# Patient Record
Sex: FEMALE | Race: WHITE | NOT HISPANIC OR LATINO | URBAN - METROPOLITAN AREA
[De-identification: names, ages, dates, MRNs, and addresses within clinical notes are randomized per-mention and may not be internally consistent; named-entity substitution may affect disease eponyms.]

---

## 2018-06-05 ENCOUNTER — EMERGENCY (EMERGENCY)
Facility: HOSPITAL | Age: 43
LOS: 1 days | Discharge: HOME | End: 2018-06-05
Attending: EMERGENCY MEDICINE
Payer: MEDICAID

## 2018-06-05 VITALS
SYSTOLIC BLOOD PRESSURE: 128 MMHG | HEART RATE: 102 BPM | OXYGEN SATURATION: 98 % | DIASTOLIC BLOOD PRESSURE: 86 MMHG | TEMPERATURE: 98 F | RESPIRATION RATE: 18 BRPM

## 2018-06-05 VITALS
OXYGEN SATURATION: 98 % | TEMPERATURE: 99 F | RESPIRATION RATE: 18 BRPM | SYSTOLIC BLOOD PRESSURE: 125 MMHG | DIASTOLIC BLOOD PRESSURE: 78 MMHG | HEART RATE: 99 BPM

## 2018-06-05 LAB
ALBUMIN SERPL ELPH-MCNC: 3.7 G/DL — SIGNIFICANT CHANGE UP (ref 3.5–5)
ALP SERPL-CCNC: 88 U/L — SIGNIFICANT CHANGE UP (ref 40–120)
ALT FLD-CCNC: 34 U/L DA — SIGNIFICANT CHANGE UP (ref 10–60)
ANION GAP SERPL CALC-SCNC: 7 MMOL/L — SIGNIFICANT CHANGE UP (ref 5–17)
APPEARANCE UR: CLEAR — SIGNIFICANT CHANGE UP
AST SERPL-CCNC: 22 U/L — SIGNIFICANT CHANGE UP (ref 10–40)
BASOPHILS # BLD AUTO: 0.1 K/UL — SIGNIFICANT CHANGE UP (ref 0–0.2)
BASOPHILS NFR BLD AUTO: 0.8 % — SIGNIFICANT CHANGE UP (ref 0–2)
BILIRUB SERPL-MCNC: 0.3 MG/DL — SIGNIFICANT CHANGE UP (ref 0.2–1.2)
BILIRUB UR-MCNC: NEGATIVE — SIGNIFICANT CHANGE UP
BUN SERPL-MCNC: 11 MG/DL — SIGNIFICANT CHANGE UP (ref 7–18)
CALCIUM SERPL-MCNC: 8.3 MG/DL — LOW (ref 8.4–10.5)
CHLORIDE SERPL-SCNC: 107 MMOL/L — SIGNIFICANT CHANGE UP (ref 96–108)
CO2 SERPL-SCNC: 26 MMOL/L — SIGNIFICANT CHANGE UP (ref 22–31)
COLOR SPEC: YELLOW — SIGNIFICANT CHANGE UP
CREAT SERPL-MCNC: 0.6 MG/DL — SIGNIFICANT CHANGE UP (ref 0.5–1.3)
DIFF PNL FLD: ABNORMAL
EOSINOPHIL # BLD AUTO: 0.1 K/UL — SIGNIFICANT CHANGE UP (ref 0–0.5)
EOSINOPHIL NFR BLD AUTO: 1.1 % — SIGNIFICANT CHANGE UP (ref 0–6)
GLUCOSE SERPL-MCNC: 115 MG/DL — HIGH (ref 70–99)
GLUCOSE UR QL: NEGATIVE — SIGNIFICANT CHANGE UP
HCG SERPL-ACNC: <1 MIU/ML — SIGNIFICANT CHANGE UP
HCG UR QL: NEGATIVE — SIGNIFICANT CHANGE UP
HCT VFR BLD CALC: 34.3 % — LOW (ref 34.5–45)
HGB BLD-MCNC: 10.5 G/DL — LOW (ref 11.5–15.5)
KETONES UR-MCNC: ABNORMAL
LEUKOCYTE ESTERASE UR-ACNC: ABNORMAL
LIDOCAIN IGE QN: 93 U/L — SIGNIFICANT CHANGE UP (ref 73–393)
LYMPHOCYTES # BLD AUTO: 2.5 K/UL — SIGNIFICANT CHANGE UP (ref 1–3.3)
LYMPHOCYTES # BLD AUTO: 28.9 % — SIGNIFICANT CHANGE UP (ref 13–44)
MCHC RBC-ENTMCNC: 25.3 PG — LOW (ref 27–34)
MCHC RBC-ENTMCNC: 30.7 GM/DL — LOW (ref 32–36)
MCV RBC AUTO: 82.4 FL — SIGNIFICANT CHANGE UP (ref 80–100)
MONOCYTES # BLD AUTO: 0.5 K/UL — SIGNIFICANT CHANGE UP (ref 0–0.9)
MONOCYTES NFR BLD AUTO: 6.3 % — SIGNIFICANT CHANGE UP (ref 2–14)
NEUTROPHILS # BLD AUTO: 5.4 K/UL — SIGNIFICANT CHANGE UP (ref 1.8–7.4)
NEUTROPHILS NFR BLD AUTO: 62.9 % — SIGNIFICANT CHANGE UP (ref 43–77)
NITRITE UR-MCNC: NEGATIVE — SIGNIFICANT CHANGE UP
PH UR: 5 — SIGNIFICANT CHANGE UP (ref 5–8)
PLATELET # BLD AUTO: 212 K/UL — SIGNIFICANT CHANGE UP (ref 150–400)
POTASSIUM SERPL-MCNC: 3.9 MMOL/L — SIGNIFICANT CHANGE UP (ref 3.5–5.3)
POTASSIUM SERPL-SCNC: 3.9 MMOL/L — SIGNIFICANT CHANGE UP (ref 3.5–5.3)
PROT SERPL-MCNC: 7.6 G/DL — SIGNIFICANT CHANGE UP (ref 6–8.3)
PROT UR-MCNC: 30 MG/DL
RBC # BLD: 4.16 M/UL — SIGNIFICANT CHANGE UP (ref 3.8–5.2)
RBC # FLD: 14.4 % — SIGNIFICANT CHANGE UP (ref 10.3–14.5)
SODIUM SERPL-SCNC: 140 MMOL/L — SIGNIFICANT CHANGE UP (ref 135–145)
SP GR SPEC: 1.02 — SIGNIFICANT CHANGE UP (ref 1.01–1.02)
UROBILINOGEN FLD QL: 1
WBC # BLD: 8.6 K/UL — SIGNIFICANT CHANGE UP (ref 3.8–10.5)
WBC # FLD AUTO: 8.6 K/UL — SIGNIFICANT CHANGE UP (ref 3.8–10.5)

## 2018-06-05 PROCEDURE — 74176 CT ABD & PELVIS W/O CONTRAST: CPT

## 2018-06-05 PROCEDURE — 84702 CHORIONIC GONADOTROPIN TEST: CPT

## 2018-06-05 PROCEDURE — 83690 ASSAY OF LIPASE: CPT

## 2018-06-05 PROCEDURE — 74176 CT ABD & PELVIS W/O CONTRAST: CPT | Mod: 26

## 2018-06-05 PROCEDURE — 99284 EMERGENCY DEPT VISIT MOD MDM: CPT

## 2018-06-05 PROCEDURE — 81001 URINALYSIS AUTO W/SCOPE: CPT

## 2018-06-05 PROCEDURE — 81025 URINE PREGNANCY TEST: CPT

## 2018-06-05 PROCEDURE — 85027 COMPLETE CBC AUTOMATED: CPT

## 2018-06-05 PROCEDURE — 99284 EMERGENCY DEPT VISIT MOD MDM: CPT | Mod: 25

## 2018-06-05 PROCEDURE — 80053 COMPREHEN METABOLIC PANEL: CPT

## 2018-06-05 RX ORDER — SODIUM CHLORIDE 9 MG/ML
1000 INJECTION INTRAMUSCULAR; INTRAVENOUS; SUBCUTANEOUS ONCE
Qty: 0 | Refills: 0 | Status: COMPLETED | OUTPATIENT
Start: 2018-06-05 | End: 2018-06-05

## 2018-06-05 RX ADMIN — SODIUM CHLORIDE 1000 MILLILITER(S): 9 INJECTION INTRAMUSCULAR; INTRAVENOUS; SUBCUTANEOUS at 19:46

## 2018-06-05 NOTE — ED PROVIDER NOTE - PROGRESS NOTE DETAILS
CT negative for stone or pyleo.  ?passed stone.  Pt denies urinary complaints.  Pt given copy of all results. Will d/c

## 2018-06-05 NOTE — ED PROVIDER NOTE - CPE EDP SKIN NORM
Anesthesia Type: 1% lidocaine with epinephrine and a 1:10 solution of 8.4% sodium bicarbonate normal...

## 2018-06-05 NOTE — ED PROVIDER NOTE - OBJECTIVE STATEMENT
41 y/o F pt with PMHx of DM, Meningocele (congenital), and Lymphedema and no PSHx presents to ED c/o L flank pain x2 days. Pt describes L flank pain as 5/10 in intensity. Pt additionally reports mild nausea. Pt denies vomiting, hematuria, burning with urination, dysuria, difficulty urinating, or any other complaints. NKDA.

## 2018-06-05 NOTE — ED PROVIDER NOTE - MEDICAL DECISION MAKING DETAILS
41 y/o F pt presents with L flank pain and mild nausea. Will r/o kidney stone. Plan for labs, CT Abd/Pel, IVFs, and will reassess. Pt declined analgesia.

## 2020-10-27 ENCOUNTER — FORM ENCOUNTER (OUTPATIENT)
Age: 45
End: 2020-10-27

## 2020-11-14 ENCOUNTER — FORM ENCOUNTER (OUTPATIENT)
Age: 45
End: 2020-11-14

## 2020-12-08 ENCOUNTER — FORM ENCOUNTER (OUTPATIENT)
Age: 45
End: 2020-12-08

## 2021-01-11 ENCOUNTER — FORM ENCOUNTER (OUTPATIENT)
Age: 46
End: 2021-01-11

## 2021-01-12 PROBLEM — E11.9 TYPE 2 DIABETES MELLITUS WITHOUT COMPLICATIONS: Chronic | Status: ACTIVE | Noted: 2018-06-05

## 2021-01-12 PROBLEM — Q05.9 SPINA BIFIDA, UNSPECIFIED: Chronic | Status: ACTIVE | Noted: 2018-06-05

## 2021-01-12 PROBLEM — I89.0 LYMPHEDEMA, NOT ELSEWHERE CLASSIFIED: Chronic | Status: ACTIVE | Noted: 2018-06-05

## 2021-01-18 ENCOUNTER — FORM ENCOUNTER (OUTPATIENT)
Age: 46
End: 2021-01-18

## 2021-01-23 ENCOUNTER — FORM ENCOUNTER (OUTPATIENT)
Age: 46
End: 2021-01-23

## 2021-01-27 ENCOUNTER — FORM ENCOUNTER (OUTPATIENT)
Age: 46
End: 2021-01-27

## 2021-01-28 ENCOUNTER — APPOINTMENT (OUTPATIENT)
Dept: OBGYN | Facility: CLINIC | Age: 46
End: 2021-01-28
Payer: MEDICARE

## 2021-01-28 ENCOUNTER — ASOB RESULT (OUTPATIENT)
Age: 46
End: 2021-01-28

## 2021-01-28 PROCEDURE — 76830 TRANSVAGINAL US NON-OB: CPT

## 2021-02-08 ENCOUNTER — FORM ENCOUNTER (OUTPATIENT)
Age: 46
End: 2021-02-08

## 2021-02-21 ENCOUNTER — TRANSCRIPTION ENCOUNTER (OUTPATIENT)
Age: 46
End: 2021-02-21

## 2021-02-21 ENCOUNTER — EMERGENCY (EMERGENCY)
Facility: HOSPITAL | Age: 46
LOS: 1 days | Discharge: ANOTHER HOSPITAL | End: 2021-02-21
Attending: EMERGENCY MEDICINE
Payer: MEDICARE

## 2021-02-21 VITALS
WEIGHT: 279.99 LBS | SYSTOLIC BLOOD PRESSURE: 107 MMHG | OXYGEN SATURATION: 98 % | RESPIRATION RATE: 16 BRPM | HEART RATE: 70 BPM | DIASTOLIC BLOOD PRESSURE: 64 MMHG | HEIGHT: 71 IN | TEMPERATURE: 98 F

## 2021-02-21 LAB
ALBUMIN SERPL ELPH-MCNC: 3.6 G/DL — SIGNIFICANT CHANGE UP (ref 3.5–5)
ALP SERPL-CCNC: 84 U/L — SIGNIFICANT CHANGE UP (ref 40–120)
ALT FLD-CCNC: 34 U/L DA — SIGNIFICANT CHANGE UP (ref 10–60)
ANION GAP SERPL CALC-SCNC: 13 MMOL/L — SIGNIFICANT CHANGE UP (ref 5–17)
AST SERPL-CCNC: 31 U/L — SIGNIFICANT CHANGE UP (ref 10–40)
BASOPHILS # BLD AUTO: 0.08 K/UL — SIGNIFICANT CHANGE UP (ref 0–0.2)
BASOPHILS NFR BLD AUTO: 0.5 % — SIGNIFICANT CHANGE UP (ref 0–2)
BILIRUB SERPL-MCNC: 0.3 MG/DL — SIGNIFICANT CHANGE UP (ref 0.2–1.2)
BUN SERPL-MCNC: 12 MG/DL — SIGNIFICANT CHANGE UP (ref 7–18)
CALCIUM SERPL-MCNC: 8.7 MG/DL — SIGNIFICANT CHANGE UP (ref 8.4–10.5)
CHLORIDE SERPL-SCNC: 103 MMOL/L — SIGNIFICANT CHANGE UP (ref 96–108)
CO2 SERPL-SCNC: 23 MMOL/L — SIGNIFICANT CHANGE UP (ref 22–31)
CREAT SERPL-MCNC: 0.95 MG/DL — SIGNIFICANT CHANGE UP (ref 0.5–1.3)
EOSINOPHIL # BLD AUTO: 0.98 K/UL — HIGH (ref 0–0.5)
EOSINOPHIL NFR BLD AUTO: 6.6 % — HIGH (ref 0–6)
GLUCOSE SERPL-MCNC: 129 MG/DL — HIGH (ref 70–99)
HCG SERPL-ACNC: <1 MIU/ML — SIGNIFICANT CHANGE UP
HCT VFR BLD CALC: 33.2 % — LOW (ref 34.5–45)
HGB BLD-MCNC: 9.7 G/DL — LOW (ref 11.5–15.5)
IMM GRANULOCYTES NFR BLD AUTO: 0.5 % — SIGNIFICANT CHANGE UP (ref 0–1.5)
LYMPHOCYTES # BLD AUTO: 23.1 % — SIGNIFICANT CHANGE UP (ref 13–44)
LYMPHOCYTES # BLD AUTO: 3.41 K/UL — HIGH (ref 1–3.3)
MCHC RBC-ENTMCNC: 23.3 PG — LOW (ref 27–34)
MCHC RBC-ENTMCNC: 29.2 GM/DL — LOW (ref 32–36)
MCV RBC AUTO: 79.6 FL — LOW (ref 80–100)
MONOCYTES # BLD AUTO: 1.05 K/UL — HIGH (ref 0–0.9)
MONOCYTES NFR BLD AUTO: 7.1 % — SIGNIFICANT CHANGE UP (ref 2–14)
NEUTROPHILS # BLD AUTO: 9.14 K/UL — HIGH (ref 1.8–7.4)
NEUTROPHILS NFR BLD AUTO: 62.2 % — SIGNIFICANT CHANGE UP (ref 43–77)
NRBC # BLD: 0 /100 WBCS — SIGNIFICANT CHANGE UP (ref 0–0)
PLATELET # BLD AUTO: 257 K/UL — SIGNIFICANT CHANGE UP (ref 150–400)
POTASSIUM SERPL-MCNC: 4.2 MMOL/L — SIGNIFICANT CHANGE UP (ref 3.5–5.3)
POTASSIUM SERPL-SCNC: 4.2 MMOL/L — SIGNIFICANT CHANGE UP (ref 3.5–5.3)
PROT SERPL-MCNC: 7.9 G/DL — SIGNIFICANT CHANGE UP (ref 6–8.3)
RBC # BLD: 4.17 M/UL — SIGNIFICANT CHANGE UP (ref 3.8–5.2)
RBC # FLD: 15.5 % — HIGH (ref 10.3–14.5)
SODIUM SERPL-SCNC: 139 MMOL/L — SIGNIFICANT CHANGE UP (ref 135–145)
TROPONIN I SERPL-MCNC: <0.015 NG/ML — SIGNIFICANT CHANGE UP (ref 0–0.04)
WBC # BLD: 14.74 K/UL — HIGH (ref 3.8–10.5)
WBC # FLD AUTO: 14.74 K/UL — HIGH (ref 3.8–10.5)

## 2021-02-21 PROCEDURE — 99291 CRITICAL CARE FIRST HOUR: CPT

## 2021-02-21 PROCEDURE — 71045 X-RAY EXAM CHEST 1 VIEW: CPT | Mod: 26

## 2021-02-21 RX ORDER — MORPHINE SULFATE 50 MG/1
4 CAPSULE, EXTENDED RELEASE ORAL ONCE
Refills: 0 | Status: DISCONTINUED | OUTPATIENT
Start: 2021-02-21 | End: 2021-02-21

## 2021-02-21 RX ORDER — METOCLOPRAMIDE HCL 10 MG
10 TABLET ORAL ONCE
Refills: 0 | Status: COMPLETED | OUTPATIENT
Start: 2021-02-21 | End: 2021-02-21

## 2021-02-21 RX ORDER — ONDANSETRON 8 MG/1
4 TABLET, FILM COATED ORAL ONCE
Refills: 0 | Status: COMPLETED | OUTPATIENT
Start: 2021-02-21 | End: 2021-02-21

## 2021-02-21 RX ORDER — CYCLOBENZAPRINE HYDROCHLORIDE 10 MG/1
10 TABLET, FILM COATED ORAL ONCE
Refills: 0 | Status: COMPLETED | OUTPATIENT
Start: 2021-02-21 | End: 2021-02-21

## 2021-02-21 RX ADMIN — ONDANSETRON 4 MILLIGRAM(S): 8 TABLET, FILM COATED ORAL at 20:57

## 2021-02-21 RX ADMIN — CYCLOBENZAPRINE HYDROCHLORIDE 10 MILLIGRAM(S): 10 TABLET, FILM COATED ORAL at 21:53

## 2021-02-21 RX ADMIN — Medication 30 MILLILITER(S): at 20:57

## 2021-02-21 RX ADMIN — Medication 10 MILLIGRAM(S): at 22:30

## 2021-02-21 RX ADMIN — MORPHINE SULFATE 4 MILLIGRAM(S): 50 CAPSULE, EXTENDED RELEASE ORAL at 23:00

## 2021-02-21 NOTE — ED PROVIDER NOTE - OBJECTIVE STATEMENT
46 y/o woman, h/o lumbar meningocele, left iliac stent, DM (on Metformin and glimepiride), lymphedema, c/o chest pain radiating to jaw and abdomen and back, dizziness, nausea and vomiting today.  No SOB/fever/cough/syncope.  Pt had COVID-19 in March 2020 and says she tested positive for COVID-19 Ab after that.

## 2021-02-21 NOTE — ED PROVIDER NOTE - CLINICAL SUMMARY MEDICAL DECISION MAKING FREE TEXT BOX
46 y/o woman, h/o lumbar meningocele, left iliac stent, DM (on Metformin and glimepiride), lymphedema, c/o chest pain radiating to jaw and abdomen and back, dizziness, nausea and vomiting today--labs, EKG, CXR, symptomatic treatment, reassess.

## 2021-02-21 NOTE — ED ADULT TRIAGE NOTE - CHIEF COMPLAINT QUOTE
BIBA c/o burning pain on her chest, states she was bending over when she started having  pain on her leg which radiated to her back and chest, h/o GERD & DM

## 2021-02-21 NOTE — ED ADULT NURSE NOTE - NSIMPLEMENTINTERV_GEN_ALL_ED
Implemented All Universal Safety Interventions:  [place] to call system. Call bell, personal items and telephone within reach. Instruct patient to call for assistance. Room bathroom lighting operational. Non-slip footwear when patient is off stretcher. Physically safe environment: no spills, clutter or unnecessary equipment. Stretcher in lowest position, wheels locked, appropriate side rails in place.

## 2021-02-22 ENCOUNTER — RESULT REVIEW (OUTPATIENT)
Age: 46
End: 2021-02-22

## 2021-02-22 ENCOUNTER — APPOINTMENT (OUTPATIENT)
Dept: CARDIOTHORACIC SURGERY | Facility: HOSPITAL | Age: 46
End: 2021-02-22

## 2021-02-22 ENCOUNTER — FORM ENCOUNTER (OUTPATIENT)
Age: 46
End: 2021-02-22

## 2021-02-22 ENCOUNTER — INPATIENT (INPATIENT)
Facility: HOSPITAL | Age: 46
LOS: 5 days | Discharge: HOME | DRG: 219 | End: 2021-02-28
Attending: THORACIC SURGERY (CARDIOTHORACIC VASCULAR SURGERY) | Admitting: THORACIC SURGERY (CARDIOTHORACIC VASCULAR SURGERY)
Payer: MEDICARE

## 2021-02-22 VITALS
OXYGEN SATURATION: 95 % | WEIGHT: 293 LBS | RESPIRATION RATE: 23 BRPM | HEIGHT: 71 IN | SYSTOLIC BLOOD PRESSURE: 140 MMHG | DIASTOLIC BLOOD PRESSURE: 63 MMHG | HEART RATE: 93 BPM

## 2021-02-22 VITALS
TEMPERATURE: 99 F | DIASTOLIC BLOOD PRESSURE: 85 MMHG | HEART RATE: 80 BPM | RESPIRATION RATE: 18 BRPM | OXYGEN SATURATION: 99 % | SYSTOLIC BLOOD PRESSURE: 126 MMHG

## 2021-02-22 DIAGNOSIS — I71.01 DISSECTION OF THORACIC AORTA: ICD-10-CM

## 2021-02-22 LAB
ALBUMIN SERPL ELPH-MCNC: 3.1 G/DL — LOW (ref 3.3–5)
ALBUMIN SERPL ELPH-MCNC: 4 G/DL — SIGNIFICANT CHANGE UP (ref 3.3–5)
ALP SERPL-CCNC: 47 U/L — SIGNIFICANT CHANGE UP (ref 40–120)
ALP SERPL-CCNC: 74 U/L — SIGNIFICANT CHANGE UP (ref 40–120)
ALT FLD-CCNC: 26 U/L — SIGNIFICANT CHANGE UP (ref 10–45)
ALT FLD-CCNC: 28 U/L — SIGNIFICANT CHANGE UP (ref 10–45)
ANION GAP SERPL CALC-SCNC: 11 MMOL/L — SIGNIFICANT CHANGE UP (ref 5–17)
ANION GAP SERPL CALC-SCNC: 11 MMOL/L — SIGNIFICANT CHANGE UP (ref 5–17)
ANISOCYTOSIS BLD QL: SLIGHT — SIGNIFICANT CHANGE UP
APTT BLD: 29.9 SEC — SIGNIFICANT CHANGE UP (ref 27.5–35.5)
APTT BLD: 30.6 SEC — SIGNIFICANT CHANGE UP (ref 27.5–35.5)
APTT BLD: 32.2 SEC — SIGNIFICANT CHANGE UP (ref 27.5–35.5)
AST SERPL-CCNC: 26 U/L — SIGNIFICANT CHANGE UP (ref 10–40)
AST SERPL-CCNC: 73 U/L — HIGH (ref 10–40)
BASOPHILS # BLD AUTO: 0 K/UL — SIGNIFICANT CHANGE UP (ref 0–0.2)
BASOPHILS NFR BLD AUTO: 0 % — SIGNIFICANT CHANGE UP (ref 0–2)
BILIRUB SERPL-MCNC: 0.2 MG/DL — SIGNIFICANT CHANGE UP (ref 0.2–1.2)
BILIRUB SERPL-MCNC: 1 MG/DL — SIGNIFICANT CHANGE UP (ref 0.2–1.2)
BLD GP AB SCN SERPL QL: NEGATIVE — SIGNIFICANT CHANGE UP
BLD GP AB SCN SERPL QL: SIGNIFICANT CHANGE UP
BUN SERPL-MCNC: 15 MG/DL — SIGNIFICANT CHANGE UP (ref 7–23)
BUN SERPL-MCNC: 16 MG/DL — SIGNIFICANT CHANGE UP (ref 7–23)
CALCIUM SERPL-MCNC: 10 MG/DL — SIGNIFICANT CHANGE UP (ref 8.4–10.5)
CALCIUM SERPL-MCNC: 8.6 MG/DL — SIGNIFICANT CHANGE UP (ref 8.4–10.5)
CHLORIDE SERPL-SCNC: 100 MMOL/L — SIGNIFICANT CHANGE UP (ref 96–108)
CHLORIDE SERPL-SCNC: 106 MMOL/L — SIGNIFICANT CHANGE UP (ref 96–108)
CK MB BLD-MCNC: 4.8 % — HIGH (ref 0–3.5)
CK MB CFR SERPL CALC: 51.3 NG/ML — HIGH (ref 0–3.8)
CK SERPL-CCNC: 1074 U/L — HIGH (ref 25–170)
CO2 SERPL-SCNC: 24 MMOL/L — SIGNIFICANT CHANGE UP (ref 22–31)
CO2 SERPL-SCNC: 25 MMOL/L — SIGNIFICANT CHANGE UP (ref 22–31)
CREAT SERPL-MCNC: 0.65 MG/DL — SIGNIFICANT CHANGE UP (ref 0.5–1.3)
CREAT SERPL-MCNC: 0.82 MG/DL — SIGNIFICANT CHANGE UP (ref 0.5–1.3)
EOSINOPHIL # BLD AUTO: 0.2 K/UL — SIGNIFICANT CHANGE UP (ref 0–0.5)
EOSINOPHIL NFR BLD AUTO: 0.9 % — SIGNIFICANT CHANGE UP (ref 0–6)
FIBRINOGEN PPP-MCNC: 342 MG/DL — SIGNIFICANT CHANGE UP (ref 290–520)
FIBRINOGEN PPP-MCNC: 412 MG/DL — SIGNIFICANT CHANGE UP (ref 290–520)
GAS PNL BLDA: SIGNIFICANT CHANGE UP
GLUCOSE BLDC GLUCOMTR-MCNC: 163 MG/DL — HIGH (ref 70–99)
GLUCOSE BLDC GLUCOMTR-MCNC: 181 MG/DL — HIGH (ref 70–99)
GLUCOSE BLDC GLUCOMTR-MCNC: 216 MG/DL — HIGH (ref 70–99)
GLUCOSE BLDC GLUCOMTR-MCNC: 221 MG/DL — HIGH (ref 70–99)
GLUCOSE BLDC GLUCOMTR-MCNC: 222 MG/DL — HIGH (ref 70–99)
GLUCOSE BLDC GLUCOMTR-MCNC: 223 MG/DL — HIGH (ref 70–99)
GLUCOSE BLDC GLUCOMTR-MCNC: 236 MG/DL — HIGH (ref 70–99)
GLUCOSE SERPL-MCNC: 131 MG/DL — HIGH (ref 70–99)
GLUCOSE SERPL-MCNC: 222 MG/DL — HIGH (ref 70–99)
HCT VFR BLD CALC: 30.8 % — LOW (ref 34.5–45)
HCT VFR BLD CALC: 32.8 % — LOW (ref 34.5–45)
HGB BLD-MCNC: 10.2 G/DL — LOW (ref 11.5–15.5)
HGB BLD-MCNC: 9.2 G/DL — LOW (ref 11.5–15.5)
INR BLD: 1.05 RATIO — SIGNIFICANT CHANGE UP (ref 0.88–1.16)
INR BLD: 1.1 RATIO — SIGNIFICANT CHANGE UP (ref 0.88–1.16)
INR BLD: 1.28 RATIO — HIGH (ref 0.88–1.16)
LYMPHOCYTES # BLD AUTO: 15.6 % — SIGNIFICANT CHANGE UP (ref 13–44)
LYMPHOCYTES # BLD AUTO: 3.41 K/UL — HIGH (ref 1–3.3)
MAGNESIUM SERPL-MCNC: 1.8 MG/DL — SIGNIFICANT CHANGE UP (ref 1.6–2.6)
MAGNESIUM SERPL-MCNC: 2.8 MG/DL — HIGH (ref 1.6–2.6)
MANUAL SMEAR VERIFICATION: SIGNIFICANT CHANGE UP
MCHC RBC-ENTMCNC: 23.5 PG — LOW (ref 27–34)
MCHC RBC-ENTMCNC: 24.6 PG — LOW (ref 27–34)
MCHC RBC-ENTMCNC: 29.9 GM/DL — LOW (ref 32–36)
MCHC RBC-ENTMCNC: 31.1 GM/DL — LOW (ref 32–36)
MCV RBC AUTO: 78.8 FL — LOW (ref 80–100)
MCV RBC AUTO: 79 FL — LOW (ref 80–100)
METAMYELOCYTES # FLD: 1.7 % — HIGH (ref 0–0)
MICROCYTES BLD QL: SLIGHT — SIGNIFICANT CHANGE UP
MONOCYTES # BLD AUTO: 0.94 K/UL — HIGH (ref 0–0.9)
MONOCYTES NFR BLD AUTO: 4.3 % — SIGNIFICANT CHANGE UP (ref 2–14)
MYELOCYTES NFR BLD: 0.9 % — HIGH (ref 0–0)
NEUTROPHILS # BLD AUTO: 16.54 K/UL — HIGH (ref 1.8–7.4)
NEUTROPHILS NFR BLD AUTO: 69.6 % — SIGNIFICANT CHANGE UP (ref 43–77)
NEUTS BAND # BLD: 6.1 % — SIGNIFICANT CHANGE UP (ref 0–8)
NRBC # BLD: 0 /100 WBCS — SIGNIFICANT CHANGE UP (ref 0–0)
PHOSPHATE SERPL-MCNC: 3.8 MG/DL — SIGNIFICANT CHANGE UP (ref 2.5–4.5)
PHOSPHATE SERPL-MCNC: 4 MG/DL — SIGNIFICANT CHANGE UP (ref 2.5–4.5)
PLAT MORPH BLD: NORMAL — SIGNIFICANT CHANGE UP
PLATELET # BLD AUTO: 175 K/UL — SIGNIFICANT CHANGE UP (ref 150–400)
PLATELET # BLD AUTO: 227 K/UL — SIGNIFICANT CHANGE UP (ref 150–400)
POTASSIUM SERPL-MCNC: 4.9 MMOL/L — SIGNIFICANT CHANGE UP (ref 3.5–5.3)
POTASSIUM SERPL-MCNC: 5 MMOL/L — SIGNIFICANT CHANGE UP (ref 3.5–5.3)
POTASSIUM SERPL-SCNC: 4.9 MMOL/L — SIGNIFICANT CHANGE UP (ref 3.5–5.3)
POTASSIUM SERPL-SCNC: 5 MMOL/L — SIGNIFICANT CHANGE UP (ref 3.5–5.3)
PROT SERPL-MCNC: 5.3 G/DL — LOW (ref 6–8.3)
PROT SERPL-MCNC: 7.3 G/DL — SIGNIFICANT CHANGE UP (ref 6–8.3)
PROTHROM AB SERPL-ACNC: 12.5 SEC — SIGNIFICANT CHANGE UP (ref 10.6–13.6)
PROTHROM AB SERPL-ACNC: 13.1 SEC — SIGNIFICANT CHANGE UP (ref 10.6–13.6)
PROTHROM AB SERPL-ACNC: 15.2 SEC — HIGH (ref 10.6–13.6)
RBC # BLD: 3.91 M/UL — SIGNIFICANT CHANGE UP (ref 3.8–5.2)
RBC # BLD: 4.15 M/UL — SIGNIFICANT CHANGE UP (ref 3.8–5.2)
RBC # FLD: 15.5 % — HIGH (ref 10.3–14.5)
RBC # FLD: 15.7 % — HIGH (ref 10.3–14.5)
RBC BLD AUTO: ABNORMAL
RH IG SCN BLD-IMP: NEGATIVE — SIGNIFICANT CHANGE UP
SARS-COV-2 RNA SPEC QL NAA+PROBE: SIGNIFICANT CHANGE UP
SARS-COV-2 RNA SPEC QL NAA+PROBE: SIGNIFICANT CHANGE UP
SODIUM SERPL-SCNC: 136 MMOL/L — SIGNIFICANT CHANGE UP (ref 135–145)
SODIUM SERPL-SCNC: 141 MMOL/L — SIGNIFICANT CHANGE UP (ref 135–145)
TROPONIN I SERPL-MCNC: 0.1 NG/ML — HIGH (ref 0–0.04)
TROPONIN T, HIGH SENSITIVITY RESULT: 574 NG/L — HIGH (ref 0–51)
VARIANT LYMPHS # BLD: 0.9 % — SIGNIFICANT CHANGE UP (ref 0–6)
WBC # BLD: 14.01 K/UL — HIGH (ref 3.8–10.5)
WBC # BLD: 21.85 K/UL — HIGH (ref 3.8–10.5)
WBC # FLD AUTO: 14.01 K/UL — HIGH (ref 3.8–10.5)
WBC # FLD AUTO: 21.85 K/UL — HIGH (ref 3.8–10.5)

## 2021-02-22 PROCEDURE — 96375 TX/PRO/DX INJ NEW DRUG ADDON: CPT | Mod: XU

## 2021-02-22 PROCEDURE — 74174 CTA ABD&PLVS W/CONTRAST: CPT | Mod: 26

## 2021-02-22 PROCEDURE — 93010 ELECTROCARDIOGRAM REPORT: CPT

## 2021-02-22 PROCEDURE — 36415 COLL VENOUS BLD VENIPUNCTURE: CPT

## 2021-02-22 PROCEDURE — 86850 RBC ANTIBODY SCREEN: CPT

## 2021-02-22 PROCEDURE — 86901 BLOOD TYPING SEROLOGIC RH(D): CPT

## 2021-02-22 PROCEDURE — 80053 COMPREHEN METABOLIC PANEL: CPT

## 2021-02-22 PROCEDURE — 74174 CTA ABD&PLVS W/CONTRAST: CPT

## 2021-02-22 PROCEDURE — 84702 CHORIONIC GONADOTROPIN TEST: CPT

## 2021-02-22 PROCEDURE — ZZZZZ: CPT

## 2021-02-22 PROCEDURE — 93005 ELECTROCARDIOGRAM TRACING: CPT

## 2021-02-22 PROCEDURE — 71045 X-RAY EXAM CHEST 1 VIEW: CPT

## 2021-02-22 PROCEDURE — U0005: CPT

## 2021-02-22 PROCEDURE — 99292 CRITICAL CARE ADDL 30 MIN: CPT

## 2021-02-22 PROCEDURE — 96374 THER/PROPH/DIAG INJ IV PUSH: CPT

## 2021-02-22 PROCEDURE — 84484 ASSAY OF TROPONIN QUANT: CPT

## 2021-02-22 PROCEDURE — 85610 PROTHROMBIN TIME: CPT

## 2021-02-22 PROCEDURE — 71275 CT ANGIOGRAPHY CHEST: CPT | Mod: 26

## 2021-02-22 PROCEDURE — 71275 CT ANGIOGRAPHY CHEST: CPT

## 2021-02-22 PROCEDURE — 33863 ASCENDING AORTIC GRAFT: CPT

## 2021-02-22 PROCEDURE — 86900 BLOOD TYPING SEROLOGIC ABO: CPT

## 2021-02-22 PROCEDURE — 99291 CRITICAL CARE FIRST HOUR: CPT

## 2021-02-22 PROCEDURE — 88305 TISSUE EXAM BY PATHOLOGIST: CPT | Mod: 26

## 2021-02-22 PROCEDURE — 85730 THROMBOPLASTIN TIME PARTIAL: CPT

## 2021-02-22 PROCEDURE — 87635 SARS-COV-2 COVID-19 AMP PRB: CPT

## 2021-02-22 PROCEDURE — 71045 X-RAY EXAM CHEST 1 VIEW: CPT | Mod: 26

## 2021-02-22 PROCEDURE — 99291 CRITICAL CARE FIRST HOUR: CPT | Mod: 24

## 2021-02-22 PROCEDURE — 85025 COMPLETE CBC W/AUTO DIFF WBC: CPT

## 2021-02-22 RX ORDER — CHLORHEXIDINE GLUCONATE 213 G/1000ML
15 SOLUTION TOPICAL EVERY 12 HOURS
Refills: 0 | Status: DISCONTINUED | OUTPATIENT
Start: 2021-02-22 | End: 2021-02-23

## 2021-02-22 RX ORDER — FAMOTIDINE 10 MG/ML
0 INJECTION INTRAVENOUS
Qty: 0 | Refills: 0 | DISCHARGE

## 2021-02-22 RX ORDER — NOREPINEPHRINE BITARTRATE/D5W 8 MG/250ML
0.05 PLASTIC BAG, INJECTION (ML) INTRAVENOUS
Qty: 8 | Refills: 0 | Status: DISCONTINUED | OUTPATIENT
Start: 2021-02-22 | End: 2021-02-24

## 2021-02-22 RX ORDER — PANTOPRAZOLE SODIUM 20 MG/1
40 TABLET, DELAYED RELEASE ORAL DAILY
Refills: 0 | Status: DISCONTINUED | OUTPATIENT
Start: 2021-02-22 | End: 2021-02-23

## 2021-02-22 RX ORDER — POLYETHYLENE GLYCOL 3350 17 G/17G
17 POWDER, FOR SOLUTION ORAL DAILY
Refills: 0 | Status: DISCONTINUED | OUTPATIENT
Start: 2021-02-22 | End: 2021-02-28

## 2021-02-22 RX ORDER — CHLORHEXIDINE GLUCONATE 213 G/1000ML
1 SOLUTION TOPICAL
Refills: 0 | Status: DISCONTINUED | OUTPATIENT
Start: 2021-02-22 | End: 2021-02-28

## 2021-02-22 RX ORDER — ALBUMIN HUMAN 25 %
250 VIAL (ML) INTRAVENOUS ONCE
Refills: 0 | Status: COMPLETED | OUTPATIENT
Start: 2021-02-22 | End: 2021-02-22

## 2021-02-22 RX ORDER — INFLUENZA VIRUS VACCINE 15; 15; 15; 15 UG/.5ML; UG/.5ML; UG/.5ML; UG/.5ML
0.5 SUSPENSION INTRAMUSCULAR ONCE
Refills: 0 | Status: DISCONTINUED | OUTPATIENT
Start: 2021-02-22 | End: 2021-02-28

## 2021-02-22 RX ORDER — SODIUM CHLORIDE 9 MG/ML
1000 INJECTION, SOLUTION INTRAVENOUS
Refills: 0 | Status: DISCONTINUED | OUTPATIENT
Start: 2021-02-22 | End: 2021-02-28

## 2021-02-22 RX ORDER — DEXTROSE 50 % IN WATER 50 %
25 SYRINGE (ML) INTRAVENOUS
Refills: 0 | Status: DISCONTINUED | OUTPATIENT
Start: 2021-02-22 | End: 2021-02-28

## 2021-02-22 RX ORDER — OMEPRAZOLE 10 MG/1
0 CAPSULE, DELAYED RELEASE ORAL
Qty: 0 | Refills: 0 | DISCHARGE

## 2021-02-22 RX ORDER — CEFUROXIME AXETIL 250 MG
1500 TABLET ORAL EVERY 8 HOURS
Refills: 0 | Status: COMPLETED | OUTPATIENT
Start: 2021-02-22 | End: 2021-02-24

## 2021-02-22 RX ORDER — HYDROMORPHONE HYDROCHLORIDE 2 MG/ML
0.5 INJECTION INTRAMUSCULAR; INTRAVENOUS; SUBCUTANEOUS ONCE
Refills: 0 | Status: DISCONTINUED | OUTPATIENT
Start: 2021-02-22 | End: 2021-02-23

## 2021-02-22 RX ORDER — MEPERIDINE HYDROCHLORIDE 50 MG/ML
25 INJECTION INTRAMUSCULAR; INTRAVENOUS; SUBCUTANEOUS ONCE
Refills: 0 | Status: DISCONTINUED | OUTPATIENT
Start: 2021-02-22 | End: 2021-02-23

## 2021-02-22 RX ORDER — HYDROMORPHONE HYDROCHLORIDE 2 MG/ML
0.5 INJECTION INTRAMUSCULAR; INTRAVENOUS; SUBCUTANEOUS ONCE
Refills: 0 | Status: DISCONTINUED | OUTPATIENT
Start: 2021-02-22 | End: 2021-02-22

## 2021-02-22 RX ORDER — ACETAMINOPHEN 500 MG
1000 TABLET ORAL ONCE
Refills: 0 | Status: COMPLETED | OUTPATIENT
Start: 2021-02-22 | End: 2021-02-22

## 2021-02-22 RX ORDER — FENTANYL CITRATE 50 UG/ML
50 INJECTION INTRAVENOUS ONCE
Refills: 0 | Status: DISCONTINUED | OUTPATIENT
Start: 2021-02-22 | End: 2021-02-22

## 2021-02-22 RX ORDER — NICARDIPINE HYDROCHLORIDE 30 MG/1
5 CAPSULE, EXTENDED RELEASE ORAL
Qty: 40 | Refills: 0 | Status: DISCONTINUED | OUTPATIENT
Start: 2021-02-22 | End: 2021-02-25

## 2021-02-22 RX ORDER — DEXTROSE 50 % IN WATER 50 %
50 SYRINGE (ML) INTRAVENOUS
Refills: 0 | Status: DISCONTINUED | OUTPATIENT
Start: 2021-02-22 | End: 2021-02-28

## 2021-02-22 RX ORDER — SODIUM CHLORIDE 9 MG/ML
1000 INJECTION INTRAMUSCULAR; INTRAVENOUS; SUBCUTANEOUS
Refills: 0 | Status: DISCONTINUED | OUTPATIENT
Start: 2021-02-22 | End: 2021-02-28

## 2021-02-22 RX ORDER — ASPIRIN/CALCIUM CARB/MAGNESIUM 324 MG
0 TABLET ORAL
Qty: 0 | Refills: 0 | DISCHARGE

## 2021-02-22 RX ORDER — DEXMEDETOMIDINE HYDROCHLORIDE IN 0.9% SODIUM CHLORIDE 4 UG/ML
0.3 INJECTION INTRAVENOUS
Qty: 200 | Refills: 0 | Status: DISCONTINUED | OUTPATIENT
Start: 2021-02-22 | End: 2021-02-23

## 2021-02-22 RX ORDER — MUPIROCIN 20 MG/G
1 OINTMENT TOPICAL
Refills: 0 | Status: COMPLETED | OUTPATIENT
Start: 2021-02-22 | End: 2021-02-27

## 2021-02-22 RX ORDER — INSULIN HUMAN 100 [IU]/ML
3 INJECTION, SOLUTION SUBCUTANEOUS
Qty: 100 | Refills: 0 | Status: DISCONTINUED | OUTPATIENT
Start: 2021-02-22 | End: 2021-02-24

## 2021-02-22 RX ADMIN — HYDROMORPHONE HYDROCHLORIDE 0.5 MILLIGRAM(S): 2 INJECTION INTRAMUSCULAR; INTRAVENOUS; SUBCUTANEOUS at 15:45

## 2021-02-22 RX ADMIN — Medication 100 MILLIGRAM(S): at 16:19

## 2021-02-22 RX ADMIN — Medication 12.6 MICROGRAM(S)/KG/MIN: at 13:00

## 2021-02-22 RX ADMIN — DEXMEDETOMIDINE HYDROCHLORIDE IN 0.9% SODIUM CHLORIDE 10.1 MICROGRAM(S)/KG/HR: 4 INJECTION INTRAVENOUS at 14:33

## 2021-02-22 RX ADMIN — CHLORHEXIDINE GLUCONATE 15 MILLILITER(S): 213 SOLUTION TOPICAL at 16:19

## 2021-02-22 RX ADMIN — INSULIN HUMAN 3 UNIT(S)/HR: 100 INJECTION, SOLUTION SUBCUTANEOUS at 14:33

## 2021-02-22 RX ADMIN — NICARDIPINE HYDROCHLORIDE 25 MG/HR: 30 CAPSULE, EXTENDED RELEASE ORAL at 02:21

## 2021-02-22 RX ADMIN — PANTOPRAZOLE SODIUM 40 MILLIGRAM(S): 20 TABLET, DELAYED RELEASE ORAL at 16:19

## 2021-02-22 RX ADMIN — SODIUM CHLORIDE 15 MILLILITER(S): 9 INJECTION, SOLUTION INTRAVENOUS at 20:13

## 2021-02-22 RX ADMIN — CHLORHEXIDINE GLUCONATE 1 APPLICATION(S): 213 SOLUTION TOPICAL at 16:19

## 2021-02-22 RX ADMIN — Medication 125 MILLILITER(S): at 19:58

## 2021-02-22 RX ADMIN — Medication 400 MILLIGRAM(S): at 19:55

## 2021-02-22 RX ADMIN — Medication 125 MILLILITER(S): at 21:05

## 2021-02-22 RX ADMIN — FENTANYL CITRATE 50 MICROGRAM(S): 50 INJECTION INTRAVENOUS at 22:00

## 2021-02-22 RX ADMIN — SODIUM CHLORIDE 10 MILLILITER(S): 9 INJECTION INTRAMUSCULAR; INTRAVENOUS; SUBCUTANEOUS at 14:33

## 2021-02-22 RX ADMIN — SODIUM CHLORIDE 10 MILLILITER(S): 9 INJECTION INTRAMUSCULAR; INTRAVENOUS; SUBCUTANEOUS at 20:01

## 2021-02-22 RX ADMIN — MUPIROCIN 1 APPLICATION(S): 20 OINTMENT TOPICAL at 18:16

## 2021-02-22 RX ADMIN — INSULIN HUMAN 3 UNIT(S)/HR: 100 INJECTION, SOLUTION SUBCUTANEOUS at 20:00

## 2021-02-22 NOTE — H&P ADULT - HISTORY OF PRESENT ILLNESS
46 y/o woman, h/o lumbar meningocele, left iliac stent, DM (on Metformin and glimepiride), lymphedema, c/o chest pain radiating to jaw and abdomen and back, dizziness, nausea and vomiting today.  No SOB/fever/cough/syncope.  Pt had COVID-19 in March 2020 and says she tested positive for COVID-19 Ab after that.  pt underwent a ct scan angio which showed a Type A dissection . pt transferred for emergent surgery.

## 2021-02-22 NOTE — H&P ADULT - ASSESSMENT
44 y/o woman, h/o lumbar meningocele, left iliac stent, DM (on Metformin and glimepiride), lymphedema, seen at [facility] for chest pain, and found to have a Type A dissection . pt transferred to [place] for emergent Type A dissection repair.

## 2021-02-22 NOTE — PROGRESS NOTE ADULT - SUBJECTIVE AND OBJECTIVE BOX
TOBI PATTON  MRN-[id number]  Patient is a 45y old  Female who presents with a chief complaint of Type A dissection (22 Feb 2021 13:02)    HPI:  44 y/o woman, h/o lumbar meningocele, left iliac stent, DM (on Metformin and glimepiride), lymphedema, c/o chest pain radiating to jaw and abdomen and back, dizziness, nausea and vomiting today.  No SOB/fever/cough/syncope.  Pt had COVID-19 in March 2020 and says she tested positive for COVID-19 Ab after that.  pt underwent a ct scan angio which showed a Type A dissection . pt transferred for emergent surgery.  (22 Feb 2021 03:54)      Surgery/Hospital course:  2/22 Ascending Aorta repair     Today/Overnight:  Patient's lactate is downtrending at 2 from a high of 4.5 earlier.     Vital Signs Last 24 Hrs  T(C): 36.4 (22 Feb 2021 16:00), Max: 37.1 (21 Feb 2021 23:53)  T(F): 97.5 (22 Feb 2021 16:00), Max: 98.8 (21 Feb 2021 23:53)  HR: 78 (22 Feb 2021 21:00) (58 - 93)  BP: 121/58 (22 Feb 2021 03:15) (109/65 - 140/63)  BP(mean): 83 (22 Feb 2021 03:15) (83 - 90)  RR: 21 (22 Feb 2021 21:00) (10 - 31)  SpO2: 97% (22 Feb 2021 21:00) (95% - 100%)  ============================I/O===========================  I&O's Summary    22 Feb 2021 07:01  -  22 Feb 2021 21:33  --------------------------------------------------------  IN: 946.7 mL / OUT: 945 mL / NET: 1.7 mL    ============================ LABS =========================                        10.2   21.85 )-----------( 175      ( 22 Feb 2021 12:44 )             32.8     02-22    141  |  106  |  16  ----------------------------<  131<H>  5.0   |  24  |  0.65    Ca    10.0      22 Feb 2021 12:44  Phos  3.8     02-22  Mg     2.8     02-22    TPro  5.3<L>  /  Alb  3.1<L>  /  TBili  1.0  /  DBili  x   /  AST  73<H>  /  ALT  28  /  AlkPhos  47  02-22    LIVER FUNCTIONS - ( 22 Feb 2021 12:44 )  Alb: 3.1 g/dL / Pro: 5.3 g/dL / ALK PHOS: 47 U/L / ALT: 28 U/L / AST: 73 U/L / GGT: x           PT/INR - ( 22 Feb 2021 12:44 )   PT: 15.2 sec;   INR: 1.28 ratio         PTT - ( 22 Feb 2021 12:44 )  PTT:30.6 sec  ABG - ( 22 Feb 2021 20:43 )  pH, Arterial: 7.39  pH, Blood: x     /  pCO2: 41    /  pO2: 86    / HCO3: 24    / Base Excess: -.4   /  SaO2: 96                  ======================Micro/Rad/Cardio=================  CXR: Reviewed  ======================================================  PAST MEDICAL & SURGICAL HISTORY:  Meningocele    Lymphedema    DM (diabetes mellitus)    No significant past surgical history      ========================ASSESSMENT ================  Type A dissection s/p Ascending Aortic Arch Repair on 2/22   Vasogenic Shock  Hypovolemic Shock   Normal Ejection fraction   Diabetes mellitus with hyperglycemia, A1c %  Leukocytosis  Anemia  Lactate acidosis    Plan:  ====================== NEUROLOGY=====================  Addressed analgesic regimen to optimize function.    ==================== RESPIRATORY======================  Patient initially on full ventilator support, subsequently weaned to pressure support and extubated while closely monitoring respiratory rate, breathing pattern, pulse ox monitoring, and intermittent blood gas analysis.    ====================CARDIOVASCULAR==================  Patient with Type A Aortic dissection now status post ascending aortic arch repair on 2/22/21. IV Norepinephrine infusion for vasogenic shock. Invasive hemodynamic monitoring with a central venous catheter & an A-line were required for the continuous central venous and MAP/BP monitoring to ensure adequate cardiovascular support. Volume resuscitation ordered for hypovolemic shock. Ventricular pacing wires present, box is off as patient is not requiring pacing.     ===================HEMATOLOGIC/ONC ===================  Monitor hemoglobin and hematocrit levels. Patient required intraoperative blood and blood product transfusions of two units of packed red blood cells,  one unit platelets, one unit fresh frozen plasma, and five units of cryoprecipitate.     ===================== RENAL =========================  Optimize renal perfusion with adequate volume resuscitation and continued monitoring of urine output, fluid balance, BUN/Creatinine.     ==================== GASTROINTESTINAL===================  Nothing by mouth after recent procedure. Protonix  for stress ulcer prophylaxis. Continue bowel regimen with Miralax.     =======================    ENDOCRINE  =====================  Metabolic stability, stress hyperglycemia/history of diabetes mellitus required an IV regular Insulin drip while following serial glucose levels to help achieve and maintain euglycemia.      insulin regular Infusion 3 Unit(s)/Hr (3 mL/Hr) IV Continuous <Continuous>    ========================INFECTIOUS DISEASE================  Continue Cefuroxime for perioperative antibiotic coverage. White blood cell count elevated at 21.7 and patient is afebrile. Continue to monitor complete blood cell count.     cefuroxime  IVPB 1500 milliGRAM(s) IV Intermittent every 8 hours      Patient requires continuous monitoring with bedside rhythm monitoring, pulse oximetry monitoring, and continuous central venous and arterial pressure monitoring; and intermittent blood gas analysis.  Care plan discussed with ICU care team.    Patient remained critical, at risk for life threatening decompensation.   I have spent 35 minutes providing acute care with multiple re-evaluations throughout the evening.     By signing my name below, I, Lauren Limon, attest that this documentation has been prepared under the direction and in the presence of Jennifer Fulton MD   Electronically signed: Clara Best, 02-22-21 @ 21:20    I, Jennifer Fulton MD  personally performed the services described in this documentation. All medical record entries made by the robynibsudha were at my direction and in my presence. I have reviewed the chart and agree that the record reflects my personal performance and is accurate and complete  Electronically signed: Jennifer Fulton MD  02-22-21 @ 21:20       TOBI PATTON  MRN-[id number]  Patient is a 45y old  Female who presents with a chief complaint of Type A dissection (22 Feb 2021 13:02)    HPI:  46 y/o woman, h/o lumbar meningocele, left iliac stent, DM (on Metformin and glimepiride), lymphedema, c/o chest pain radiating to jaw and abdomen and back, dizziness, nausea and vomiting today.  No SOB/fever/cough/syncope.  Pt had COVID-19 in March 2020 and says she tested positive for COVID-19 Ab after that.  pt underwent a ct scan angio which showed a Type A dissection . pt transferred for emergent surgery.  (22 Feb 2021 03:54)      Surgery/Hospital course:  2/22 Ascending Aorta repair     Today/Overnight:  Patient's lactate is downtrending at 2 from a high of 4.5 earlier.     Vital Signs Last 24 Hrs  T(C): 36.4 (22 Feb 2021 16:00), Max: 37.1 (21 Feb 2021 23:53)  T(F): 97.5 (22 Feb 2021 16:00), Max: 98.8 (21 Feb 2021 23:53)  HR: 78 (22 Feb 2021 21:00) (58 - 93)  BP: 121/58 (22 Feb 2021 03:15) (109/65 - 140/63)  BP(mean): 83 (22 Feb 2021 03:15) (83 - 90)  RR: 21 (22 Feb 2021 21:00) (10 - 31)  SpO2: 97% (22 Feb 2021 21:00) (95% - 100%)  ============================I/O===========================  I&O's Summary    22 Feb 2021 07:01  -  22 Feb 2021 21:33  --------------------------------------------------------  IN: 946.7 mL / OUT: 945 mL / NET: 1.7 mL    ============================ LABS =========================                        10.2   21.85 )-----------( 175      ( 22 Feb 2021 12:44 )             32.8     02-22    141  |  106  |  16  ----------------------------<  131<H>  5.0   |  24  |  0.65    Ca    10.0      22 Feb 2021 12:44  Phos  3.8     02-22  Mg     2.8     02-22    TPro  5.3<L>  /  Alb  3.1<L>  /  TBili  1.0  /  DBili  x   /  AST  73<H>  /  ALT  28  /  AlkPhos  47  02-22    LIVER FUNCTIONS - ( 22 Feb 2021 12:44 )  Alb: 3.1 g/dL / Pro: 5.3 g/dL / ALK PHOS: 47 U/L / ALT: 28 U/L / AST: 73 U/L / GGT: x           PT/INR - ( 22 Feb 2021 12:44 )   PT: 15.2 sec;   INR: 1.28 ratio         PTT - ( 22 Feb 2021 12:44 )  PTT:30.6 sec  ABG - ( 22 Feb 2021 20:43 )  pH, Arterial: 7.39  pH, Blood: x     /  pCO2: 41    /  pO2: 86    / HCO3: 24    / Base Excess: -.4   /  SaO2: 96                  ======================Micro/Rad/Cardio=================  CXR: Reviewed  ======================================================  PAST MEDICAL & SURGICAL HISTORY:  Meningocele    Lymphedema    DM (diabetes mellitus)    No significant past surgical history      ========================ASSESSMENT ================  Type A dissection s/p Ascending Aortic Arch Repair on 2/22   Vasogenic Shock  Hypovolemic Shock   Normal Ejection fraction   Diabetes mellitus with hyperglycemia, A1c %  Leukocytosis  Anemia  Lactate acidosis    Plan:  ====================== NEUROLOGY=====================  Addressed analgesic regimen to optimize function.    ==================== RESPIRATORY======================  Patient initially on full ventilator support, subsequently weaned to pressure support and extubated while closely monitoring respiratory rate, breathing pattern, pulse ox monitoring, and intermittent blood gas analysis.    ====================CARDIOVASCULAR==================  Patient with Type A Aortic dissection now status post ascending aortic arch repair on 2/22/21. IV Norepinephrine infusion for vasogenic shock. Invasive hemodynamic monitoring with a central venous catheter & an A-line were required for the continuous central venous and MAP/BP monitoring to ensure adequate cardiovascular support. Volume resuscitation ordered for hypovolemic shock with increased lactate. Ventricular pacing wires present, box is off as patient is not requiring pacing.     ===================HEMATOLOGIC/ONC ===================  Monitor hemoglobin and hematocrit levels. Patient required intraoperative blood and blood product transfusions of two units of packed red blood cells,  one unit platelets, one unit fresh frozen plasma, and five units of cryoprecipitate.     ===================== RENAL =========================  Optimize renal perfusion with adequate volume resuscitation and continued monitoring of urine output, fluid balance, BUN/Creatinine.     ==================== GASTROINTESTINAL===================  Nothing by mouth after recent procedure. Protonix  for stress ulcer prophylaxis. Continue bowel regimen with Miralax.     =======================    ENDOCRINE  =====================  Metabolic stability, stress hyperglycemia/history of type 2 diabetes mellitus required an IV regular Insulin drip while following serial glucose levels to help achieve and maintain euglycemia.      insulin regular Infusion 3 Unit(s)/Hr (3 mL/Hr) IV Continuous <Continuous>    ========================INFECTIOUS DISEASE================  Continue Cefuroxime for perioperative antibiotic coverage. White blood cell count elevated at 21.7 and patient is afebrile. Continue to monitor complete blood cell count.     cefuroxime  IVPB 1500 milliGRAM(s) IV Intermittent every 8 hours      Patient requires continuous monitoring with bedside rhythm monitoring, pulse oximetry monitoring, and continuous central venous and arterial pressure monitoring; and intermittent blood gas analysis.  Care plan discussed with ICU care team.    Patient remained critical, at risk for life threatening decompensation.   I have spent 40 minutes providing acute care with multiple re-evaluations throughout the evening.     By signing my name below, I, Lauren Limon, attest that this documentation has been prepared under the direction and in the presence of Jennifer Fulton MD   Electronically signed: Clara Best, 02-22-21 @ 21:20    I, Jennifer Fulton MD  personally performed the services described in this documentation. All medical record entries made by the robynibsudha were at my direction and in my presence. I have reviewed the chart and agree that the record reflects my personal performance and is accurate and complete  Electronically signed: Jennifer Fulton MD  02-22-21 @ 21:20

## 2021-02-22 NOTE — BRIEF OPERATIVE NOTE - OPERATION/FINDINGS
Repair of Type A Aortic dissection with 28mm Hemashield [place] ascending aorta interposition graft.  Resuspension of the aortic valve.  Aortic cross clamp time of 101 minutes.  Right axillary cutdown with 8mm graft to the axillary artery for arterial cannulation

## 2021-02-22 NOTE — H&P ADULT - NSHPLABSRESULTS_GEN_ALL_CORE
9.2    14.01 )-----------( 227      ( 22 Feb 2021 03:25 )             30.8       Hemoglobin: 9.2 g/dL (02-22 @ 03:25)  Hemoglobin: 9.7 g/dL (02-21 @ 20:12)      02-22    136  |  100  |  15  ----------------------------<  222<H>  4.9   |  25  |  0.82    Ca    8.6      22 Feb 2021 03:25  Phos  4.0     02-22  Mg     1.8     02-22    TPro  7.3  /  Alb  4.0  /  TBili  0.2  /  DBili  x   /  AST  26  /  ALT  26  /  AlkPhos  74  02-22    Creatinine Trend: 0.82<--, 0.95<--    COAGS: PT/INR - ( 22 Feb 2021 03:25 )   PT: 13.1 sec;   INR: 1.10 ratio         PTT - ( 22 Feb 2021 03:25 )  PTT:29.9 sec    CARDIAC MARKERS ( 22 Feb 2021 00:46 )  0.100 ng/mL / x     / x     / x     / x      CARDIAC MARKERS ( 21 Feb 2021 20:12 )  <0.015 ng/mL / x     / x     / x     / x            T(C): 37.1 (02-22-21 @ 02:15), Max: 37.1 (02-21-21 @ 23:53)  HR: 82 (02-22-21 @ 03:30) (58 - 93)  BP: 121/58 (02-22-21 @ 03:15) (107/64 - 140/63)  RR: 31 (02-22-21 @ 03:30) (16 - 31)  SpO2: 99% (02-22-21 @ 03:30) (95% - 99%)  Wt(kg): --    I&O's Summary

## 2021-02-22 NOTE — PATIENT PROFILE ADULT - NSPRESCRALCAMT_GEN_A_NUR
Returned patient's call after conferring with Dr. Ulloa. Informed her we will send prescriptions for a Medrol Dose Pack and Tramadol to her preferred pharmacy. Patient verbalized understanding.   1 or 2

## 2021-02-22 NOTE — PRE-ANESTHESIA EVALUATION ADULT - NSANTHPMHFT_GEN_ALL_CORE
45 y F with pmhx of HTN, DM, meningocele, and left lower extremity lymphedema who presented to ED with chest pain. CTA showing thoracoabdominal aortic dissection. NPO since 2/21/21 @ 15:00. COVID neg

## 2021-02-22 NOTE — PRE-ANESTHESIA EVALUATION ADULT - NSANTHOSAYNRD_GEN_A_CORE
No. JEAN-PAUL screening performed.  STOP BANG Legend: 0-2 = LOW Risk; 3-4 = INTERMEDIATE Risk; 5-8 = HIGH Risk

## 2021-02-22 NOTE — H&P ADULT - NSHPPHYSICALEXAM_GEN_ALL_CORE
Alert and oriented x 3  no JVD, no jaw pain.   Clear becky , no wheeze. no rales  Reg s1s2 , + 2/6 right sternal border  obese, mid abd scar, + bowel sounds   left leg with chronic lymphedema, + equal pulses of both lower ext.

## 2021-02-22 NOTE — AIRWAY REMOVAL NOTE  ADULT & PEDS - ARTIFICAL AIRWAY REMOVAL COMMENTS
Written order for extubation verified. The patient was identified by full name and birth date compared to the identification band. Present during the procedure was Ledy Hurtado RN.

## 2021-02-22 NOTE — PROGRESS NOTE ADULT - SUBJECTIVE AND OBJECTIVE BOX
TOBI PATTON  MRN-[id number]  Patient is a 45y old  Female who presents with a chief complaint of Type A dissection (22 Feb 2021 03:54)    HPI:  46 y/o woman, h/o lumbar meningocele, left iliac stent, DM (on Metformin and glimepiride), lymphedema, c/o chest pain radiating to jaw and abdomen and back, dizziness, nausea and vomiting today.  No SOB/fever/cough/syncope.  Pt had COVID-19 in March 2020 and says she tested positive for COVID-19 Ab after that.  pt underwent a ct scan angio which showed a Type A dissection . pt transferred for emergent surgery.  (22 Feb 2021 03:54)      Surgery/Hospital Course:  2/22 aortic dissection repair     Today:    REVIEW OF SYSTEMS:  Unable to obtain, pt intubated and sedated     ICU Vital Signs Last 24 Hrs  T(C): 37.1 (22 Feb 2021 02:15), Max: 37.1 (21 Feb 2021 23:53)  T(F): 98.8 (22 Feb 2021 02:15), Max: 98.8 (21 Feb 2021 23:53)  HR: 73 (22 Feb 2021 12:30) (58 - 93)  BP: 121/58 (22 Feb 2021 03:15) (107/64 - 140/63)  BP(mean): 83 (22 Feb 2021 03:15) (83 - 90)  ABP: 131/46 (22 Feb 2021 03:30) (131/46 - 161/63)  ABP(mean): 72 (22 Feb 2021 03:30) (72 - 90)  RR: 31 (22 Feb 2021 03:30) (16 - 31)  SpO2: 100% (22 Feb 2021 12:30) (95% - 100%)      Physical Exam:  Gen:  intubated   CNS: sedated 	  Neck: no JVD  RES : clear , no wheezing              CVS: Regular  rhythm. Normal S1/S2  Chest: +chest tubes   Abd: Soft, non-distended. Bowel sounds present.  Skin: No rash.  Ext:  no edema    ============================ LABS =========================                        10.2   21.85 )-----------( 175      ( 22 Feb 2021 12:44 )             32.8     02-22    136  |  100  |  15  ----------------------------<  222<H>  4.9   |  25  |  0.82    Ca    8.6      22 Feb 2021 03:25  Phos  4.0     02-22  Mg     1.8     02-22    TPro  7.3  /  Alb  4.0  /  TBili  0.2  /  DBili  x   /  AST  26  /  ALT  26  /  AlkPhos  74  02-22    LIVER FUNCTIONS - ( 22 Feb 2021 03:25 )  Alb: 4.0 g/dL / Pro: 7.3 g/dL / ALK PHOS: 74 U/L / ALT: 26 U/L / AST: 26 U/L / GGT: x           PT/INR - ( 22 Feb 2021 03:25 )   PT: 13.1 sec;   INR: 1.10 ratio         PTT - ( 22 Feb 2021 03:25 )  PTT:29.9 sec  ABG - ( 22 Feb 2021 12:44 )  pH, Arterial: 7.42  pH, Blood: x     /  pCO2: 38    /  pO2: 226   / HCO3: 24    / Base Excess: .3    /  SaO2: 100                 ======================Micro/Rad/Cardio=================  CXR: Reviewed  Echo:Reviewed  ======================================================  PAST MEDICAL & SURGICAL HISTORY:  Meningocele    Lymphedema    DM (diabetes mellitus)    No significant past surgical history      ====================ASSESSMENT ==============  Ascending aortic dissection s/p repair  Diabetes mellitus     Plan:  ====================== NEUROLOGY=====================  Sedated with IV Precedex to maintain ventilator synchrony   Meperidine for analgesia     dexMEDEtomidine Infusion 0.3 MICROgram(s)/kG/Hr (10.1 mL/Hr) IV Continuous <Continuous>  meperidine     Injectable 25 milliGRAM(s) IV Push once    ==================== RESPIRATORY======================  Respiratory status required full ventilatory support, close monitoring of respiratory rate and breathing pattern, the following of ABG’s with A-line monitoring, continuous pulse oximetry monitoring    Mechanical Ventilation:  Mode: AC/ CMV (Assist Control/ Continuous Mandatory Ventilation)  RR (machine): 12  TV (machine): 600  FiO2: 100  PEEP: 5  ITime: 0.1  MAP: 12  PIP: 29      ====================CARDIOVASCULAR==================  Aortic dissection s/p repair   Continue invasive hemodynamic monitoring via PAC   On pressor support with IV Vasopressin   Back up pacing wires in place     norepinephrine Infusion 0.05 MICROgram(s)/kG/Min (12.6 mL/Hr) IV Continuous <Continuous>    ===================HEMATOLOGIC/ONC ===================  Monitor H&H/Plts      ===================== RENAL =========================  Continue monitoring urine output, I&OS, BUN/Cr     ==================== GASTROINTESTINAL===================  NPO post op, will advance diet as tolerated   Bowel regimen with Miralax     dextrose 5%. 1000 milliLiter(s) (15 mL/Hr) IV Continuous <Continuous>  GI prophylaxis, pantoprazole  Injectable 40 milliGRAM(s) IV Push daily  polyethylene glycol 3350 17 Gram(s) Oral daily  sodium chloride 0.9%. 1000 milliLiter(s) (10 mL/Hr) IV Continuous <Continuous>    =======================    ENDOCRINE  =====================  Hx of DM, continue glucose control with IV insulin     dextrose 50% Injectable 50 milliLiter(s) IV Push every 15 minutes  dextrose 50% Injectable 25 milliLiter(s) IV Push every 15 minutes  insulin regular Infusion 3 Unit(s)/Hr (3 mL/Hr) IV Continuous <Continuous>    ========================INFECTIOUS DISEASE================  Perioperative coverage with cefuroxime     cefuroxime  IVPB 1500 milliGRAM(s) IV Intermittent every 8 hours      Patient requires continuous monitoring with bedside rhythm monitoring, pulse ox monitoring, and intermittent blood gas analysis. Care plan discussed with ICU care team. Patient remained critical and at risk for life threatening decompensation.     By signing my name below, I, Clara Crabtree, attest that this documentation has been prepared under the direction and in the presence of KAREN Miguel   Electronically signed: Reynaldo Mo, 02-22-21 @ 13:02    I, Nadine Rivera, personally performed the services described in this documentation. all medical record entries made by the reynaldo were at my direction and in my presence. I have reviewed the chart and agree that the record reflects my personal performance and is accurate and complete  Electronically signed: KAREN Miguel        TOBI PATTON  MRN-[id number]  Patient is a 45y old  Female who presents with a chief complaint of Type A dissection (22 Feb 2021 03:54)    HPI:  44 y/o woman, h/o lumbar meningocele, left iliac stent, DM (on Metformin and glimepiride), lymphedema, c/o chest pain radiating to jaw and abdomen and back, dizziness, nausea and vomiting today.  No SOB/fever/cough/syncope.  Pt had COVID-19 in March 2020 and says she tested positive for COVID-19 Ab after that.  pt underwent a ct scan angio which showed a Type A dissection . pt transferred for emergent surgery.  (22 Feb 2021 03:54)      Surgery/Hospital Course:  2/22 aortic dissection repair     Today: Patient came out of OR hemodynamically adequate on Levo and Precedex drip.     REVIEW OF SYSTEMS:  Unable to obtain, pt intubated and sedated     ICU Vital Signs Last 24 Hrs  T(C): 37.1 (22 Feb 2021 02:15), Max: 37.1 (21 Feb 2021 23:53)  T(F): 98.8 (22 Feb 2021 02:15), Max: 98.8 (21 Feb 2021 23:53)  HR: 73 (22 Feb 2021 12:30) (58 - 93)  BP: 121/58 (22 Feb 2021 03:15) (107/64 - 140/63)  BP(mean): 83 (22 Feb 2021 03:15) (83 - 90)  ABP: 131/46 (22 Feb 2021 03:30) (131/46 - 161/63)  ABP(mean): 72 (22 Feb 2021 03:30) (72 - 90)  RR: 31 (22 Feb 2021 03:30) (16 - 31)  SpO2: 100% (22 Feb 2021 12:30) (95% - 100%)      Physical Exam:  Gen:  intubated   CNS: sedated 	  Neck: no JVD  RES : clear , no wheezing              CVS: Regular  rhythm. Normal S1/S2  Chest: +chest tubes   Abd: Soft, non-distended. Bowel sounds present.  Skin: No rash.  Ext:  no edema    ============================ LABS =========================                        10.2   21.85 )-----------( 175      ( 22 Feb 2021 12:44 )             32.8     02-22    136  |  100  |  15  ----------------------------<  222<H>  4.9   |  25  |  0.82    Ca    8.6      22 Feb 2021 03:25  Phos  4.0     02-22  Mg     1.8     02-22    TPro  7.3  /  Alb  4.0  /  TBili  0.2  /  DBili  x   /  AST  26  /  ALT  26  /  AlkPhos  74  02-22    LIVER FUNCTIONS - ( 22 Feb 2021 03:25 )  Alb: 4.0 g/dL / Pro: 7.3 g/dL / ALK PHOS: 74 U/L / ALT: 26 U/L / AST: 26 U/L / GGT: x           PT/INR - ( 22 Feb 2021 03:25 )   PT: 13.1 sec;   INR: 1.10 ratio         PTT - ( 22 Feb 2021 03:25 )  PTT:29.9 sec  ABG - ( 22 Feb 2021 12:44 )  pH, Arterial: 7.42  pH, Blood: x     /  pCO2: 38    /  pO2: 226   / HCO3: 24    / Base Excess: .3    /  SaO2: 100                 ======================Micro/Rad/Cardio=================  CXR: Reviewed  Echo:Reviewed  ======================================================  PAST MEDICAL & SURGICAL HISTORY:  Meningocele    Lymphedema    DM (diabetes mellitus)    No significant past surgical history      ====================ASSESSMENT ==============  Ascending aortic dissection s/p repair  Diabetes mellitus     Plan:  ====================== NEUROLOGY=====================  Sedated with IV Precedex to maintain ventilator synchrony   Meperidine for analgesia     dexMEDEtomidine Infusion 0.3 MICROgram(s)/kG/Hr (10.1 mL/Hr) IV Continuous <Continuous>  meperidine     Injectable 25 milliGRAM(s) IV Push once    ==================== RESPIRATORY======================  Respiratory status required full ventilatory support, close monitoring of respiratory rate and breathing pattern, the following of ABG’s with A-line monitoring, continuous pulse oximetry monitoring    Mechanical Ventilation:  Mode: AC/ CMV (Assist Control/ Continuous Mandatory Ventilation)  RR (machine): 12  TV (machine): 600  FiO2: 100  PEEP: 5  ITime: 0.1  MAP: 12  PIP: 29      ====================CARDIOVASCULAR==================  Aortic dissection s/p repair   Continue invasive hemodynamic monitoring via PAC   On pressor support with IV Vasopressin   Back up pacing wires in place     norepinephrine Infusion 0.05 MICROgram(s)/kG/Min (12.6 mL/Hr) IV Continuous <Continuous>    ===================HEMATOLOGIC/ONC ===================  Monitor H&H/Plts      ===================== RENAL =========================  Continue monitoring urine output, I&OS, BUN/Cr     ==================== GASTROINTESTINAL===================  NPO post op, will advance diet as tolerated   Bowel regimen with Miralax     dextrose 5%. 1000 milliLiter(s) (15 mL/Hr) IV Continuous <Continuous>  GI prophylaxis, pantoprazole  Injectable 40 milliGRAM(s) IV Push daily  polyethylene glycol 3350 17 Gram(s) Oral daily  sodium chloride 0.9%. 1000 milliLiter(s) (10 mL/Hr) IV Continuous <Continuous>    =======================    ENDOCRINE  =====================  Hx of DM, continue glucose control with IV insulin     dextrose 50% Injectable 50 milliLiter(s) IV Push every 15 minutes  dextrose 50% Injectable 25 milliLiter(s) IV Push every 15 minutes  insulin regular Infusion 3 Unit(s)/Hr (3 mL/Hr) IV Continuous <Continuous>    ========================INFECTIOUS DISEASE================  Perioperative coverage with cefuroxime     cefuroxime  IVPB 1500 milliGRAM(s) IV Intermittent every 8 hours      Patient requires continuous monitoring with bedside rhythm monitoring, pulse ox monitoring, and intermittent blood gas analysis. Care plan discussed with ICU care team. Patient remained critical and at risk for life threatening decompensation.     By signing my name below, I, Clara Crabtree, attest that this documentation has been prepared under the direction and in the presence of KAREN Miguel   Electronically signed: Reynaldo Mo, 02-22-21 @ 13:02    I, Nadine Rivera, personally performed the services described in this documentation. all medical record entries made by the reynaldo were at my direction and in my presence. I have reviewed the chart and agree that the record reflects my personal performance and is accurate and complete  Electronically signed: KAREN Miguel

## 2021-02-23 DIAGNOSIS — R73.9 HYPERGLYCEMIA, UNSPECIFIED: ICD-10-CM

## 2021-02-23 DIAGNOSIS — Z98.890 OTHER SPECIFIED POSTPROCEDURAL STATES: ICD-10-CM

## 2021-02-23 LAB
A1C WITH ESTIMATED AVERAGE GLUCOSE RESULT: 6.3 % — HIGH (ref 4–5.6)
ALBUMIN SERPL ELPH-MCNC: 3.4 G/DL — SIGNIFICANT CHANGE UP (ref 3.3–5)
ALP SERPL-CCNC: 46 U/L — SIGNIFICANT CHANGE UP (ref 40–120)
ALT FLD-CCNC: 36 U/L — SIGNIFICANT CHANGE UP (ref 10–45)
ANION GAP SERPL CALC-SCNC: 12 MMOL/L — SIGNIFICANT CHANGE UP (ref 5–17)
AST SERPL-CCNC: 90 U/L — HIGH (ref 10–40)
BILIRUB SERPL-MCNC: 0.6 MG/DL — SIGNIFICANT CHANGE UP (ref 0.2–1.2)
BUN SERPL-MCNC: 19 MG/DL — SIGNIFICANT CHANGE UP (ref 7–23)
CALCIUM SERPL-MCNC: 9.2 MG/DL — SIGNIFICANT CHANGE UP (ref 8.4–10.5)
CHLORIDE SERPL-SCNC: 108 MMOL/L — SIGNIFICANT CHANGE UP (ref 96–108)
CO2 SERPL-SCNC: 22 MMOL/L — SIGNIFICANT CHANGE UP (ref 22–31)
CREAT SERPL-MCNC: 0.59 MG/DL — SIGNIFICANT CHANGE UP (ref 0.5–1.3)
ESTIMATED AVERAGE GLUCOSE: 134 MG/DL — HIGH (ref 68–114)
GAS PNL BLDA: SIGNIFICANT CHANGE UP
GLUCOSE BLDC GLUCOMTR-MCNC: 120 MG/DL — HIGH (ref 70–99)
GLUCOSE BLDC GLUCOMTR-MCNC: 128 MG/DL — HIGH (ref 70–99)
GLUCOSE BLDC GLUCOMTR-MCNC: 129 MG/DL — HIGH (ref 70–99)
GLUCOSE BLDC GLUCOMTR-MCNC: 131 MG/DL — HIGH (ref 70–99)
GLUCOSE BLDC GLUCOMTR-MCNC: 132 MG/DL — HIGH (ref 70–99)
GLUCOSE BLDC GLUCOMTR-MCNC: 132 MG/DL — HIGH (ref 70–99)
GLUCOSE BLDC GLUCOMTR-MCNC: 136 MG/DL — HIGH (ref 70–99)
GLUCOSE BLDC GLUCOMTR-MCNC: 139 MG/DL — HIGH (ref 70–99)
GLUCOSE BLDC GLUCOMTR-MCNC: 141 MG/DL — HIGH (ref 70–99)
GLUCOSE BLDC GLUCOMTR-MCNC: 141 MG/DL — HIGH (ref 70–99)
GLUCOSE BLDC GLUCOMTR-MCNC: 142 MG/DL — HIGH (ref 70–99)
GLUCOSE BLDC GLUCOMTR-MCNC: 145 MG/DL — HIGH (ref 70–99)
GLUCOSE BLDC GLUCOMTR-MCNC: 152 MG/DL — HIGH (ref 70–99)
GLUCOSE BLDC GLUCOMTR-MCNC: 152 MG/DL — HIGH (ref 70–99)
GLUCOSE BLDC GLUCOMTR-MCNC: 157 MG/DL — HIGH (ref 70–99)
GLUCOSE BLDC GLUCOMTR-MCNC: 158 MG/DL — HIGH (ref 70–99)
GLUCOSE BLDC GLUCOMTR-MCNC: 166 MG/DL — HIGH (ref 70–99)
GLUCOSE BLDC GLUCOMTR-MCNC: 167 MG/DL — HIGH (ref 70–99)
GLUCOSE BLDC GLUCOMTR-MCNC: 174 MG/DL — HIGH (ref 70–99)
GLUCOSE BLDC GLUCOMTR-MCNC: 188 MG/DL — HIGH (ref 70–99)
GLUCOSE SERPL-MCNC: 145 MG/DL — HIGH (ref 70–99)
HCT VFR BLD CALC: 32 % — LOW (ref 34.5–45)
HGB BLD-MCNC: 9.9 G/DL — LOW (ref 11.5–15.5)
MAGNESIUM SERPL-MCNC: 1.9 MG/DL — SIGNIFICANT CHANGE UP (ref 1.6–2.6)
MCHC RBC-ENTMCNC: 25 PG — LOW (ref 27–34)
MCHC RBC-ENTMCNC: 30.9 GM/DL — LOW (ref 32–36)
MCV RBC AUTO: 80.8 FL — SIGNIFICANT CHANGE UP (ref 80–100)
NRBC # BLD: 0 /100 WBCS — SIGNIFICANT CHANGE UP (ref 0–0)
PHOSPHATE SERPL-MCNC: 4.8 MG/DL — HIGH (ref 2.5–4.5)
PLATELET # BLD AUTO: 145 K/UL — LOW (ref 150–400)
POTASSIUM SERPL-MCNC: 4.3 MMOL/L — SIGNIFICANT CHANGE UP (ref 3.5–5.3)
POTASSIUM SERPL-SCNC: 4.3 MMOL/L — SIGNIFICANT CHANGE UP (ref 3.5–5.3)
PROT SERPL-MCNC: 5.8 G/DL — LOW (ref 6–8.3)
RBC # BLD: 3.96 M/UL — SIGNIFICANT CHANGE UP (ref 3.8–5.2)
RBC # FLD: 15.8 % — HIGH (ref 10.3–14.5)
SARS-COV-2 RNA SPEC QL NAA+PROBE: SIGNIFICANT CHANGE UP
SODIUM SERPL-SCNC: 142 MMOL/L — SIGNIFICANT CHANGE UP (ref 135–145)
WBC # BLD: 14.45 K/UL — HIGH (ref 3.8–10.5)
WBC # FLD AUTO: 14.45 K/UL — HIGH (ref 3.8–10.5)

## 2021-02-23 PROCEDURE — 93010 ELECTROCARDIOGRAM REPORT: CPT

## 2021-02-23 PROCEDURE — 71045 X-RAY EXAM CHEST 1 VIEW: CPT | Mod: 26

## 2021-02-23 RX ORDER — HYDROMORPHONE HYDROCHLORIDE 2 MG/ML
1 INJECTION INTRAMUSCULAR; INTRAVENOUS; SUBCUTANEOUS ONCE
Refills: 0 | Status: DISCONTINUED | OUTPATIENT
Start: 2021-02-23 | End: 2021-02-23

## 2021-02-23 RX ORDER — LABETALOL HCL 100 MG
100 TABLET ORAL EVERY 8 HOURS
Refills: 0 | Status: DISCONTINUED | OUTPATIENT
Start: 2021-02-23 | End: 2021-02-26

## 2021-02-23 RX ORDER — NALOXONE HYDROCHLORIDE 4 MG/.1ML
0.1 SPRAY NASAL
Refills: 0 | Status: DISCONTINUED | OUTPATIENT
Start: 2021-02-23 | End: 2021-02-24

## 2021-02-23 RX ORDER — HYDROMORPHONE HYDROCHLORIDE 2 MG/ML
0.5 INJECTION INTRAMUSCULAR; INTRAVENOUS; SUBCUTANEOUS
Refills: 0 | Status: DISCONTINUED | OUTPATIENT
Start: 2021-02-23 | End: 2021-02-24

## 2021-02-23 RX ORDER — FAMOTIDINE 10 MG/ML
20 INJECTION INTRAVENOUS DAILY
Refills: 0 | Status: DISCONTINUED | OUTPATIENT
Start: 2021-02-23 | End: 2021-02-28

## 2021-02-23 RX ORDER — PANTOPRAZOLE SODIUM 20 MG/1
40 TABLET, DELAYED RELEASE ORAL
Refills: 0 | Status: DISCONTINUED | OUTPATIENT
Start: 2021-02-23 | End: 2021-02-28

## 2021-02-23 RX ORDER — LABETALOL HCL 100 MG
20 TABLET ORAL ONCE
Refills: 0 | Status: COMPLETED | OUTPATIENT
Start: 2021-02-23 | End: 2021-02-23

## 2021-02-23 RX ORDER — SODIUM CHLORIDE 9 MG/ML
250 INJECTION, SOLUTION INTRAVENOUS ONCE
Refills: 0 | Status: COMPLETED | OUTPATIENT
Start: 2021-02-23 | End: 2021-02-23

## 2021-02-23 RX ORDER — ACETAMINOPHEN 500 MG
1000 TABLET ORAL ONCE
Refills: 0 | Status: COMPLETED | OUTPATIENT
Start: 2021-02-23 | End: 2021-02-23

## 2021-02-23 RX ORDER — MAGNESIUM SULFATE 500 MG/ML
2 VIAL (ML) INJECTION ONCE
Refills: 0 | Status: COMPLETED | OUTPATIENT
Start: 2021-02-23 | End: 2021-02-23

## 2021-02-23 RX ORDER — ASPIRIN/CALCIUM CARB/MAGNESIUM 324 MG
81 TABLET ORAL DAILY
Refills: 0 | Status: DISCONTINUED | OUTPATIENT
Start: 2021-02-23 | End: 2021-02-28

## 2021-02-23 RX ORDER — ENOXAPARIN SODIUM 100 MG/ML
30 INJECTION SUBCUTANEOUS EVERY 12 HOURS
Refills: 0 | Status: DISCONTINUED | OUTPATIENT
Start: 2021-02-23 | End: 2021-02-28

## 2021-02-23 RX ORDER — HYDROMORPHONE HYDROCHLORIDE 2 MG/ML
0.5 INJECTION INTRAMUSCULAR; INTRAVENOUS; SUBCUTANEOUS ONCE
Refills: 0 | Status: DISCONTINUED | OUTPATIENT
Start: 2021-02-23 | End: 2021-02-23

## 2021-02-23 RX ORDER — FUROSEMIDE 40 MG
20 TABLET ORAL ONCE
Refills: 0 | Status: COMPLETED | OUTPATIENT
Start: 2021-02-23 | End: 2021-02-23

## 2021-02-23 RX ORDER — ONDANSETRON 8 MG/1
4 TABLET, FILM COATED ORAL EVERY 6 HOURS
Refills: 0 | Status: DISCONTINUED | OUTPATIENT
Start: 2021-02-23 | End: 2021-02-28

## 2021-02-23 RX ORDER — INSULIN GLARGINE 100 [IU]/ML
10 INJECTION, SOLUTION SUBCUTANEOUS AT BEDTIME
Refills: 0 | Status: DISCONTINUED | OUTPATIENT
Start: 2021-02-23 | End: 2021-02-24

## 2021-02-23 RX ORDER — HYDROMORPHONE HYDROCHLORIDE 2 MG/ML
30 INJECTION INTRAMUSCULAR; INTRAVENOUS; SUBCUTANEOUS
Refills: 0 | Status: DISCONTINUED | OUTPATIENT
Start: 2021-02-23 | End: 2021-02-24

## 2021-02-23 RX ADMIN — Medication 100 MILLIGRAM(S): at 21:52

## 2021-02-23 RX ADMIN — HYDROMORPHONE HYDROCHLORIDE 30 MILLILITER(S): 2 INJECTION INTRAMUSCULAR; INTRAVENOUS; SUBCUTANEOUS at 04:57

## 2021-02-23 RX ADMIN — HYDROMORPHONE HYDROCHLORIDE 30 MILLILITER(S): 2 INJECTION INTRAMUSCULAR; INTRAVENOUS; SUBCUTANEOUS at 03:07

## 2021-02-23 RX ADMIN — Medication 100 MILLIGRAM(S): at 00:06

## 2021-02-23 RX ADMIN — Medication 100 MILLIGRAM(S): at 16:00

## 2021-02-23 RX ADMIN — POLYETHYLENE GLYCOL 3350 17 GRAM(S): 17 POWDER, FOR SOLUTION ORAL at 11:33

## 2021-02-23 RX ADMIN — ENOXAPARIN SODIUM 30 MILLIGRAM(S): 100 INJECTION SUBCUTANEOUS at 17:08

## 2021-02-23 RX ADMIN — MUPIROCIN 1 APPLICATION(S): 20 OINTMENT TOPICAL at 05:40

## 2021-02-23 RX ADMIN — Medication 400 MILLIGRAM(S): at 02:18

## 2021-02-23 RX ADMIN — Medication 20 MILLIGRAM(S): at 02:15

## 2021-02-23 RX ADMIN — HYDROMORPHONE HYDROCHLORIDE 0.5 MILLIGRAM(S): 2 INJECTION INTRAMUSCULAR; INTRAVENOUS; SUBCUTANEOUS at 23:15

## 2021-02-23 RX ADMIN — Medication 50 GRAM(S): at 22:43

## 2021-02-23 RX ADMIN — HYDROMORPHONE HYDROCHLORIDE 30 MILLILITER(S): 2 INJECTION INTRAMUSCULAR; INTRAVENOUS; SUBCUTANEOUS at 19:02

## 2021-02-23 RX ADMIN — HYDROMORPHONE HYDROCHLORIDE 0.5 MILLIGRAM(S): 2 INJECTION INTRAMUSCULAR; INTRAVENOUS; SUBCUTANEOUS at 17:38

## 2021-02-23 RX ADMIN — MUPIROCIN 1 APPLICATION(S): 20 OINTMENT TOPICAL at 17:08

## 2021-02-23 RX ADMIN — Medication 20 MILLIGRAM(S): at 05:15

## 2021-02-23 RX ADMIN — PANTOPRAZOLE SODIUM 40 MILLIGRAM(S): 20 TABLET, DELAYED RELEASE ORAL at 09:02

## 2021-02-23 RX ADMIN — Medication 81 MILLIGRAM(S): at 11:33

## 2021-02-23 RX ADMIN — HYDROMORPHONE HYDROCHLORIDE 30 MILLILITER(S): 2 INJECTION INTRAMUSCULAR; INTRAVENOUS; SUBCUTANEOUS at 12:14

## 2021-02-23 RX ADMIN — CHLORHEXIDINE GLUCONATE 1 APPLICATION(S): 213 SOLUTION TOPICAL at 05:40

## 2021-02-23 RX ADMIN — CHLORHEXIDINE GLUCONATE 15 MILLILITER(S): 213 SOLUTION TOPICAL at 06:30

## 2021-02-23 RX ADMIN — Medication 400 MILLIGRAM(S): at 11:33

## 2021-02-23 RX ADMIN — Medication 100 MILLIGRAM(S): at 11:05

## 2021-02-23 RX ADMIN — SODIUM CHLORIDE 500 MILLILITER(S): 9 INJECTION, SOLUTION INTRAVENOUS at 06:43

## 2021-02-23 RX ADMIN — HYDROMORPHONE HYDROCHLORIDE 30 MILLILITER(S): 2 INJECTION INTRAMUSCULAR; INTRAVENOUS; SUBCUTANEOUS at 07:11

## 2021-02-23 RX ADMIN — Medication 20 MILLIGRAM(S): at 01:15

## 2021-02-23 RX ADMIN — INSULIN GLARGINE 10 UNIT(S): 100 INJECTION, SOLUTION SUBCUTANEOUS at 21:52

## 2021-02-23 RX ADMIN — ONDANSETRON 4 MILLIGRAM(S): 8 TABLET, FILM COATED ORAL at 09:16

## 2021-02-23 RX ADMIN — HYDROMORPHONE HYDROCHLORIDE 30 MILLILITER(S): 2 INJECTION INTRAMUSCULAR; INTRAVENOUS; SUBCUTANEOUS at 17:36

## 2021-02-23 RX ADMIN — INSULIN HUMAN 3 UNIT(S)/HR: 100 INJECTION, SOLUTION SUBCUTANEOUS at 12:26

## 2021-02-23 RX ADMIN — HYDROMORPHONE HYDROCHLORIDE 0.5 MILLIGRAM(S): 2 INJECTION INTRAMUSCULAR; INTRAVENOUS; SUBCUTANEOUS at 00:45

## 2021-02-23 RX ADMIN — FAMOTIDINE 20 MILLIGRAM(S): 10 INJECTION INTRAVENOUS at 11:05

## 2021-02-23 RX ADMIN — HYDROMORPHONE HYDROCHLORIDE 1 MILLIGRAM(S): 2 INJECTION INTRAMUSCULAR; INTRAVENOUS; SUBCUTANEOUS at 01:15

## 2021-02-23 RX ADMIN — Medication 100 MILLIGRAM(S): at 09:02

## 2021-02-23 NOTE — PHYSICAL THERAPY INITIAL EVALUATION ADULT - ADDITIONAL COMMENTS
Pt reprots she lives with her children (22 &19) in an apartment with 3 steps to enter +rails & no other stairs. Pt reports she was independent with all mobility prior to admit.

## 2021-02-23 NOTE — PHYSICAL THERAPY INITIAL EVALUATION ADULT - GENERAL OBSERVATIONS, REHAB EVAL
Pt tolerated 45min PT initial evaluation well. Pt rec'd in chair in NAD, PoD1 s/p Type A Aortic dissection & repair. +ICU montitoring, CT, ext madan, amarilis, a-line x2. Agreeable to PT. Pt appeared anxious throughout session, positive emotional support provided throughout session.

## 2021-02-23 NOTE — PHYSICAL THERAPY INITIAL EVALUATION ADULT - PERTINENT HX OF CURRENT PROBLEM, REHAB EVAL
44 y/o F h/o lumbar meningocele, left iliac stent, DM, lymphedema, c/o chest pain radiating to jaw and abdomen and back, dizziness, nausea and vomiting today.  No SOB/fever/cough/syncope.  Pt had COVID-19 in March 2020 and says she tested positive for COVID-19 Ab after that. Pt CT angio showed a Type A dissection .  S/p Type A Aortic dissection & repair 2/22.

## 2021-02-23 NOTE — PHYSICAL THERAPY INITIAL EVALUATION ADULT - GAIT DEVIATIONS NOTED, PT EVAL
antalgic crouch/decreased greg/increased time in double stance/decreased velocity of limb motion/decreased step length/decreased stride length/increased stride width/decreased swing-to-stance ratio/decreased weight-shifting ability

## 2021-02-23 NOTE — PROGRESS NOTE ADULT - SUBJECTIVE AND OBJECTIVE BOX
im ok    VITAL SIGNS    Telemetry:  nsr    Vital Signs Last 24 Hrs  T(C): 36.4 (21 @ 11:45), Max: 37.3 (21 @ 00:00)  T(F): 97.6 (21 @ 11:45), Max: 99.1 (21 @ 00:00)  HR: 86 (21 @ 11:45) (69 - 93)  BP: 96/54 (21 @ 11:45) (96/54 - 132/72)  RR: 19 (21 @ 11:45) (10 - 32)  SpO2: 97% (21 @ 11:45) (93% - 99%)                    @ 07:01  -   @ 07:00  --------------------------------------------------------  IN: 1925.7 mL / OUT: 1665 mL / NET: 260.7 mL     @ 07:01  -   @ 14:27  --------------------------------------------------------  IN: 568 mL / OUT: 195 mL / NET: 373 mL          Daily     Daily Weight in k.2 (2021 00:00)            CAPILLARY BLOOD GLUCOSE  188 (2021 11:45)  151 (2021 11:00)  152 (2021 10:00)  129 (2021 09:00)  128 (2021 08:00)  131 (2021 07:00)  152 (2021 06:00)  141 (2021 05:00)  132 (2021 04:00)  141 (2021 03:00)  145 (2021 02:00)  163 (2021 00:00)  158 (2021 23:00)  181 (2021 22:00)  190 (2021 21:00)  208 (2021 20:00)  223 (2021 19:00)  216 (2021 18:00)  222 (2021 17:00)  221 (2021 16:00)  236 (2021 15:00)      POCT Blood Glucose.: 167 mg/dL (2021 14:20)  POCT Blood Glucose.: 174 mg/dL (2021 13:31)  POCT Blood Glucose.: 188 mg/dL (2021 11:50)  POCT Blood Glucose.: 158 mg/dL (2021 11:01)  POCT Blood Glucose.: 152 mg/dL (2021 10:35)  POCT Blood Glucose.: 129 mg/dL (2021 09:06)  POCT Blood Glucose.: 128 mg/dL (2021 07:56)  POCT Blood Glucose.: 131 mg/dL (2021 06:49)  POCT Blood Glucose.: 152 mg/dL (2021 06:09)  POCT Blood Glucose.: 141 mg/dL (2021 05:09)  POCT Blood Glucose.: 132 mg/dL (2021 03:46)  POCT Blood Glucose.: 141 mg/dL (2021 02:44)  POCT Blood Glucose.: 145 mg/dL (2021 02:03)  POCT Blood Glucose.: 163 mg/dL (2021 23:56)  POCT Blood Glucose.: 181 mg/dL (2021 21:48)  POCT Blood Glucose.: 223 mg/dL (2021 18:43)  POCT Blood Glucose.: 216 mg/dL (2021 17:59)  POCT Blood Glucose.: 222 mg/dL (2021 17:23)  POCT Blood Glucose.: 221 mg/dL (2021 15:54)  POCT Blood Glucose.: 236 mg/dL (2021 14:53)            Drains:         Pacing Wires        [x  ]   Settings:    off                            Isolated  [  ]    Coumadin    [ ] YES          [ x ]      NO                                   PHYSICAL EXAM        Neurology: alert and oriented x 3, nonfocal, no gross deficits  CV : s1 s2 RRR  r ij  Sternal Wound :  CDI , Stable  Lungs: cta  ms ct  Abdomen: soft, nontender, nondistended, positive bowel sound                        :    voiding       Extremities:    trace   edema   /  -   calve tenderness ,             aspirin  chewable 81 milliGRAM(s) Oral daily  cefuroxime  IVPB 1500 milliGRAM(s) IV Intermittent every 8 hours  chlorhexidine 2% Cloths 1 Application(s) Topical <User Schedule>  dextrose 5%. 1000 milliLiter(s) IV Continuous <Continuous>  dextrose 50% Injectable 50 milliLiter(s) IV Push every 15 minutes  dextrose 50% Injectable 25 milliLiter(s) IV Push every 15 minutes  enoxaparin Injectable 30 milliGRAM(s) SubCutaneous every 12 hours  famotidine    Tablet 20 milliGRAM(s) Oral daily  HYDROmorphone PCA (1 mG/mL) 30 milliLiter(s) PCA Continuous PCA Continuous  HYDROmorphone PCA (1 mG/mL) Rescue Clinician Bolus 0.5 milliGRAM(s) IV Push every 15 minutes PRN  influenza   Vaccine 0.5 milliLiter(s) IntraMuscular once  insulin regular Infusion 3 Unit(s)/Hr IV Continuous <Continuous>  labetalol 100 milliGRAM(s) Oral every 8 hours  mupirocin 2% Ointment 1 Application(s) Both Nostrils two times a day  naloxone Injectable 0.1 milliGRAM(s) IV Push every 3 minutes PRN  norepinephrine Infusion 0.05 MICROgram(s)/kG/Min IV Continuous <Continuous>  ondansetron Injectable 4 milliGRAM(s) IV Push every 6 hours PRN  pantoprazole    Tablet 40 milliGRAM(s) Oral before breakfast  polyethylene glycol 3350 17 Gram(s) Oral daily  sodium chloride 0.9%. 1000 milliLiter(s) IV Continuous <Continuous>                    Physical Therapy Rec:   Home  [  ]   Home w/ PT  [  ]  Rehab  [  ]  Discussed with Cardiothoracic Team at AM rounds.

## 2021-02-23 NOTE — PROGRESS NOTE ADULT - ASSESSMENT
44 y/o woman, h/o lumbar meningocele, left iliac stent, DM (on Metformin and glimepiride), lymphedema, c/o chest pain radiating to jaw and abdomen and back, dizziness, nausea and vomiting today.  No SOB/fever/cough/syncope.  Pt had COVID-19 in March 2020 and says she tested positive for COVID-19 Ab after that.  pt underwent a ct scan angio which showed a Type A dissection . pt transferred for emergent surgery.   2/22/21  s/p Repair of Type A Aortic dissection with 28mm [place] ascending aorta interposition graft.  Resuspension of the aortic valve.  Aortic cross clamp time of 101 minutes.  Right axillary cutdown with 8mm graft to the axillary artery for arterial cannulation  Hypotension, elevated lactate, on norepinephrine, iv fluid resuscitation , multiple blood products  Extubated d 0  Correction in bp and de crease in lactate.  PCA for pain control  Insulin infusion,   2/23 transferred to [facility]

## 2021-02-23 NOTE — PROGRESS NOTE ADULT - SUBJECTIVE AND OBJECTIVE BOX
Day 1 of Anesthesia Pain Management Service    SUBJECTIVE: I'm having some pain in the incision  Pain Scale Score:	[X] Refer to charted pain scores    THERAPY:    [ ] IV PCA Morphine		[ ] 5 mg/mL	[ ] 1 mg/mL  [X] IV PCA Hydromorphone	[ ] 5 mg/mL	[X] 1 mg/mL  [ ] IV PCA Fentanyl		[ ] 50 micrograms/mL    Demand dose: 0.2 mg     Lockout: 6 minutes   Continuous Rate: 0 mg/hr  4 Hour Limit: 4 mg    MEDICATIONS  (STANDING):  acetaminophen  IVPB .. 1000 milliGRAM(s) IV Intermittent once  aspirin  chewable 81 milliGRAM(s) Oral daily  cefuroxime  IVPB 1500 milliGRAM(s) IV Intermittent every 8 hours  chlorhexidine 2% Cloths 1 Application(s) Topical <User Schedule>  dextrose 5%. 1000 milliLiter(s) (15 mL/Hr) IV Continuous <Continuous>  dextrose 50% Injectable 50 milliLiter(s) IV Push every 15 minutes  dextrose 50% Injectable 25 milliLiter(s) IV Push every 15 minutes  famotidine    Tablet 20 milliGRAM(s) Oral daily  HYDROmorphone PCA (1 mG/mL) 30 milliLiter(s) PCA Continuous PCA Continuous  influenza   Vaccine 0.5 milliLiter(s) IntraMuscular once  insulin regular Infusion 3 Unit(s)/Hr (3 mL/Hr) IV Continuous <Continuous>  labetalol 100 milliGRAM(s) Oral every 8 hours  mupirocin 2% Ointment 1 Application(s) Both Nostrils two times a day  norepinephrine Infusion 0.05 MICROgram(s)/kG/Min (12.6 mL/Hr) IV Continuous <Continuous>  pantoprazole    Tablet 40 milliGRAM(s) Oral before breakfast  polyethylene glycol 3350 17 Gram(s) Oral daily  sodium chloride 0.9%. 1000 milliLiter(s) (10 mL/Hr) IV Continuous <Continuous>    MEDICATIONS  (PRN):  HYDROmorphone PCA (1 mG/mL) Rescue Clinician Bolus 0.5 milliGRAM(s) IV Push every 15 minutes PRN for Pain Scale GREATER THAN 6  naloxone Injectable 0.1 milliGRAM(s) IV Push every 3 minutes PRN For ANY of the following changes in patient status:  A. RR LESS THAN 10 breaths per minute, B. Oxygen saturation LESS THAN 90%, C. Sedation score of 6  ondansetron Injectable 4 milliGRAM(s) IV Push every 6 hours PRN Nausea      OBJECTIVE:    Sedation Score:	[ X] Alert 	[ ] Drowsy 	[ ] Arousable	[ ] Asleep	[ ] Unresponsive    Side Effects:	[ ] None	[X ] Nausea	[ ] Vomiting	[ ] Pruritus  		[ ] Other:    Vital Signs Last 24 Hrs  T(C): 37.2 (23 Feb 2021 04:00), Max: 37.3 (23 Feb 2021 00:00)  T(F): 99 (23 Feb 2021 04:00), Max: 99.1 (23 Feb 2021 00:00)  HR: 85 (23 Feb 2021 08:30) (69 - 90)  BP: --  BP(mean): --  RR: 21 (23 Feb 2021 08:30) (10 - 32)  SpO2: 96% (23 Feb 2021 08:30) (93% - 100%)    ASSESSMENT/ PLAN    Therapy to  be:               [X] Continued   [ ] Discontinued   [ ] Changed to PRN Analgesics    Documentation and Verification of current medications:   [X] Done	[ ] Not done, not eligible    Comments: OOB in chair. Endorsing incisional pain. PCA dose increased by RN. PCA use 1-6x/hr. Some nausea- antiemetics prn. Consider adding IV acetaminophen x 4 doses ATC to decrease opiate side-effects. Day 1 of Anesthesia Pain Management Service    SUBJECTIVE: I'm having some pain in the incision  Pain Scale Score:	[X] Refer to charted pain scores    THERAPY:    [ ] IV PCA Morphine		[ ] 5 mg/mL	[ ] 1 mg/mL  [X] IV PCA Hydromorphone	[ ] 5 mg/mL	[X] 1 mg/mL  [ ] IV PCA Fentanyl		[ ] 50 micrograms/mL    Demand dose: 0.25 mg     Lockout: 6 minutes   Continuous Rate: 0 mg/hr  4 Hour Limit: 4 mg    MEDICATIONS  (STANDING):  acetaminophen  IVPB .. 1000 milliGRAM(s) IV Intermittent once  aspirin  chewable 81 milliGRAM(s) Oral daily  cefuroxime  IVPB 1500 milliGRAM(s) IV Intermittent every 8 hours  chlorhexidine 2% Cloths 1 Application(s) Topical <User Schedule>  dextrose 5%. 1000 milliLiter(s) (15 mL/Hr) IV Continuous <Continuous>  dextrose 50% Injectable 50 milliLiter(s) IV Push every 15 minutes  dextrose 50% Injectable 25 milliLiter(s) IV Push every 15 minutes  famotidine    Tablet 20 milliGRAM(s) Oral daily  HYDROmorphone PCA (1 mG/mL) 30 milliLiter(s) PCA Continuous PCA Continuous  influenza   Vaccine 0.5 milliLiter(s) IntraMuscular once  insulin regular Infusion 3 Unit(s)/Hr (3 mL/Hr) IV Continuous <Continuous>  labetalol 100 milliGRAM(s) Oral every 8 hours  mupirocin 2% Ointment 1 Application(s) Both Nostrils two times a day  norepinephrine Infusion 0.05 MICROgram(s)/kG/Min (12.6 mL/Hr) IV Continuous <Continuous>  pantoprazole    Tablet 40 milliGRAM(s) Oral before breakfast  polyethylene glycol 3350 17 Gram(s) Oral daily  sodium chloride 0.9%. 1000 milliLiter(s) (10 mL/Hr) IV Continuous <Continuous>    MEDICATIONS  (PRN):  HYDROmorphone PCA (1 mG/mL) Rescue Clinician Bolus 0.5 milliGRAM(s) IV Push every 15 minutes PRN for Pain Scale GREATER THAN 6  naloxone Injectable 0.1 milliGRAM(s) IV Push every 3 minutes PRN For ANY of the following changes in patient status:  A. RR LESS THAN 10 breaths per minute, B. Oxygen saturation LESS THAN 90%, C. Sedation score of 6  ondansetron Injectable 4 milliGRAM(s) IV Push every 6 hours PRN Nausea      OBJECTIVE:    Sedation Score:	[ X] Alert 	[ ] Drowsy 	[ ] Arousable	[ ] Asleep	[ ] Unresponsive    Side Effects:	[ ] None	[X ] Nausea	[ ] Vomiting	[ ] Pruritus  		[ ] Other:    Vital Signs Last 24 Hrs  T(C): 37.2 (23 Feb 2021 04:00), Max: 37.3 (23 Feb 2021 00:00)  T(F): 99 (23 Feb 2021 04:00), Max: 99.1 (23 Feb 2021 00:00)  HR: 85 (23 Feb 2021 08:30) (69 - 90)  BP: --  BP(mean): --  RR: 21 (23 Feb 2021 08:30) (10 - 32)  SpO2: 96% (23 Feb 2021 08:30) (93% - 100%)    ASSESSMENT/ PLAN    Therapy to  be:               [X] Continued   [ ] Discontinued   [ ] Changed to PRN Analgesics    Documentation and Verification of current medications:   [X] Done	[ ] Not done, not eligible    Comments: OOB in chair. Endorsing incisional pain. PCA dose increased by RN. PCA use 1-6x/hr. Some nausea- antiemetics prn. Consider adding IV acetaminophen x 4 doses ATC to decrease opiate side-effects.

## 2021-02-24 DIAGNOSIS — E66.01 MORBID (SEVERE) OBESITY DUE TO EXCESS CALORIES: ICD-10-CM

## 2021-02-24 DIAGNOSIS — E04.1 NONTOXIC SINGLE THYROID NODULE: ICD-10-CM

## 2021-02-24 DIAGNOSIS — E11.9 TYPE 2 DIABETES MELLITUS WITHOUT COMPLICATIONS: ICD-10-CM

## 2021-02-24 LAB
ALBUMIN SERPL ELPH-MCNC: 3.5 G/DL — SIGNIFICANT CHANGE UP (ref 3.3–5)
ALP SERPL-CCNC: 55 U/L — SIGNIFICANT CHANGE UP (ref 40–120)
ALT FLD-CCNC: 47 U/L — HIGH (ref 10–45)
ANION GAP SERPL CALC-SCNC: 14 MMOL/L — SIGNIFICANT CHANGE UP (ref 5–17)
AST SERPL-CCNC: 58 U/L — HIGH (ref 10–40)
BASOPHILS # BLD AUTO: 0.03 K/UL — SIGNIFICANT CHANGE UP (ref 0–0.2)
BASOPHILS NFR BLD AUTO: 0.2 % — SIGNIFICANT CHANGE UP (ref 0–2)
BILIRUB SERPL-MCNC: 0.5 MG/DL — SIGNIFICANT CHANGE UP (ref 0.2–1.2)
BUN SERPL-MCNC: 29 MG/DL — HIGH (ref 7–23)
CALCIUM SERPL-MCNC: 8.9 MG/DL — SIGNIFICANT CHANGE UP (ref 8.4–10.5)
CHLORIDE SERPL-SCNC: 101 MMOL/L — SIGNIFICANT CHANGE UP (ref 96–108)
CO2 SERPL-SCNC: 22 MMOL/L — SIGNIFICANT CHANGE UP (ref 22–31)
CREAT SERPL-MCNC: 0.78 MG/DL — SIGNIFICANT CHANGE UP (ref 0.5–1.3)
EOSINOPHIL # BLD AUTO: 0.02 K/UL — SIGNIFICANT CHANGE UP (ref 0–0.5)
EOSINOPHIL NFR BLD AUTO: 0.1 % — SIGNIFICANT CHANGE UP (ref 0–6)
GLUCOSE BLDC GLUCOMTR-MCNC: 161 MG/DL — HIGH (ref 70–99)
GLUCOSE BLDC GLUCOMTR-MCNC: 187 MG/DL — HIGH (ref 70–99)
GLUCOSE BLDC GLUCOMTR-MCNC: 190 MG/DL — HIGH (ref 70–99)
GLUCOSE BLDC GLUCOMTR-MCNC: 197 MG/DL — HIGH (ref 70–99)
GLUCOSE BLDC GLUCOMTR-MCNC: 252 MG/DL — HIGH (ref 70–99)
GLUCOSE BLDC GLUCOMTR-MCNC: 270 MG/DL — HIGH (ref 70–99)
GLUCOSE SERPL-MCNC: 172 MG/DL — HIGH (ref 70–99)
HCT VFR BLD CALC: 29.2 % — LOW (ref 34.5–45)
HGB BLD-MCNC: 8.7 G/DL — LOW (ref 11.5–15.5)
IMM GRANULOCYTES NFR BLD AUTO: 0.5 % — SIGNIFICANT CHANGE UP (ref 0–1.5)
LYMPHOCYTES # BLD AUTO: 19.9 % — SIGNIFICANT CHANGE UP (ref 13–44)
LYMPHOCYTES # BLD AUTO: 3.38 K/UL — HIGH (ref 1–3.3)
MCHC RBC-ENTMCNC: 24.6 PG — LOW (ref 27–34)
MCHC RBC-ENTMCNC: 29.8 GM/DL — LOW (ref 32–36)
MCV RBC AUTO: 82.7 FL — SIGNIFICANT CHANGE UP (ref 80–100)
MONOCYTES # BLD AUTO: 1.69 K/UL — HIGH (ref 0–0.9)
MONOCYTES NFR BLD AUTO: 9.9 % — SIGNIFICANT CHANGE UP (ref 2–14)
NEUTROPHILS # BLD AUTO: 11.78 K/UL — HIGH (ref 1.8–7.4)
NEUTROPHILS NFR BLD AUTO: 69.4 % — SIGNIFICANT CHANGE UP (ref 43–77)
NRBC # BLD: 0 /100 WBCS — SIGNIFICANT CHANGE UP (ref 0–0)
PLATELET # BLD AUTO: 150 K/UL — SIGNIFICANT CHANGE UP (ref 150–400)
POTASSIUM SERPL-MCNC: 4.7 MMOL/L — SIGNIFICANT CHANGE UP (ref 3.5–5.3)
POTASSIUM SERPL-SCNC: 4.7 MMOL/L — SIGNIFICANT CHANGE UP (ref 3.5–5.3)
PROT SERPL-MCNC: 6.6 G/DL — SIGNIFICANT CHANGE UP (ref 6–8.3)
RBC # BLD: 3.53 M/UL — LOW (ref 3.8–5.2)
RBC # FLD: 16.6 % — HIGH (ref 10.3–14.5)
SODIUM SERPL-SCNC: 137 MMOL/L — SIGNIFICANT CHANGE UP (ref 135–145)
SURGICAL PATHOLOGY STUDY: SIGNIFICANT CHANGE UP
WBC # BLD: 16.99 K/UL — HIGH (ref 3.8–10.5)
WBC # FLD AUTO: 16.99 K/UL — HIGH (ref 3.8–10.5)

## 2021-02-24 PROCEDURE — 71045 X-RAY EXAM CHEST 1 VIEW: CPT | Mod: 26

## 2021-02-24 RX ORDER — GLUCAGON INJECTION, SOLUTION 0.5 MG/.1ML
1 INJECTION, SOLUTION SUBCUTANEOUS ONCE
Refills: 0 | Status: DISCONTINUED | OUTPATIENT
Start: 2021-02-24 | End: 2021-02-28

## 2021-02-24 RX ORDER — INSULIN GLARGINE 100 [IU]/ML
15 INJECTION, SOLUTION SUBCUTANEOUS AT BEDTIME
Refills: 0 | Status: DISCONTINUED | OUTPATIENT
Start: 2021-02-24 | End: 2021-02-25

## 2021-02-24 RX ORDER — INSULIN LISPRO 100/ML
10 VIAL (ML) SUBCUTANEOUS ONCE
Refills: 0 | Status: COMPLETED | OUTPATIENT
Start: 2021-02-24 | End: 2021-02-24

## 2021-02-24 RX ORDER — HYDROMORPHONE HYDROCHLORIDE 2 MG/ML
4 INJECTION INTRAMUSCULAR; INTRAVENOUS; SUBCUTANEOUS EVERY 4 HOURS
Refills: 0 | Status: DISCONTINUED | OUTPATIENT
Start: 2021-02-24 | End: 2021-02-28

## 2021-02-24 RX ORDER — HYDROMORPHONE HYDROCHLORIDE 2 MG/ML
2 INJECTION INTRAMUSCULAR; INTRAVENOUS; SUBCUTANEOUS EVERY 4 HOURS
Refills: 0 | Status: DISCONTINUED | OUTPATIENT
Start: 2021-02-24 | End: 2021-02-28

## 2021-02-24 RX ORDER — INSULIN LISPRO 100/ML
7 VIAL (ML) SUBCUTANEOUS ONCE
Refills: 0 | Status: COMPLETED | OUTPATIENT
Start: 2021-02-24 | End: 2021-02-24

## 2021-02-24 RX ORDER — INSULIN LISPRO 100/ML
12 VIAL (ML) SUBCUTANEOUS
Refills: 0 | Status: DISCONTINUED | OUTPATIENT
Start: 2021-02-24 | End: 2021-02-27

## 2021-02-24 RX ORDER — DEXTROSE 50 % IN WATER 50 %
15 SYRINGE (ML) INTRAVENOUS ONCE
Refills: 0 | Status: DISCONTINUED | OUTPATIENT
Start: 2021-02-24 | End: 2021-02-28

## 2021-02-24 RX ORDER — INSULIN LISPRO 100/ML
VIAL (ML) SUBCUTANEOUS
Refills: 0 | Status: DISCONTINUED | OUTPATIENT
Start: 2021-02-24 | End: 2021-02-28

## 2021-02-24 RX ORDER — METOCLOPRAMIDE HCL 10 MG
5 TABLET ORAL THREE TIMES A DAY
Refills: 0 | Status: DISCONTINUED | OUTPATIENT
Start: 2021-02-24 | End: 2021-02-28

## 2021-02-24 RX ORDER — OXYCODONE AND ACETAMINOPHEN 5; 325 MG/1; MG/1
2 TABLET ORAL EVERY 6 HOURS
Refills: 0 | Status: DISCONTINUED | OUTPATIENT
Start: 2021-02-24 | End: 2021-02-24

## 2021-02-24 RX ORDER — HYDROMORPHONE HYDROCHLORIDE 2 MG/ML
0.25 INJECTION INTRAMUSCULAR; INTRAVENOUS; SUBCUTANEOUS ONCE
Refills: 0 | Status: DISCONTINUED | OUTPATIENT
Start: 2021-02-24 | End: 2021-02-25

## 2021-02-24 RX ORDER — INSULIN LISPRO 100/ML
VIAL (ML) SUBCUTANEOUS AT BEDTIME
Refills: 0 | Status: DISCONTINUED | OUTPATIENT
Start: 2021-02-24 | End: 2021-02-28

## 2021-02-24 RX ORDER — ACETAMINOPHEN 500 MG
1000 TABLET ORAL ONCE
Refills: 0 | Status: COMPLETED | OUTPATIENT
Start: 2021-02-24 | End: 2021-02-24

## 2021-02-24 RX ADMIN — Medication 400 MILLIGRAM(S): at 12:00

## 2021-02-24 RX ADMIN — Medication 5 MILLIGRAM(S): at 21:36

## 2021-02-24 RX ADMIN — Medication 100 MILLIGRAM(S): at 00:13

## 2021-02-24 RX ADMIN — Medication 12 UNIT(S): at 17:35

## 2021-02-24 RX ADMIN — Medication 400 MILLIGRAM(S): at 03:30

## 2021-02-24 RX ADMIN — Medication 81 MILLIGRAM(S): at 13:20

## 2021-02-24 RX ADMIN — Medication 7 UNIT(S): at 08:00

## 2021-02-24 RX ADMIN — HYDROMORPHONE HYDROCHLORIDE 4 MILLIGRAM(S): 2 INJECTION INTRAMUSCULAR; INTRAVENOUS; SUBCUTANEOUS at 23:09

## 2021-02-24 RX ADMIN — Medication 100 MILLIGRAM(S): at 05:23

## 2021-02-24 RX ADMIN — ONDANSETRON 4 MILLIGRAM(S): 8 TABLET, FILM COATED ORAL at 09:47

## 2021-02-24 RX ADMIN — PANTOPRAZOLE SODIUM 40 MILLIGRAM(S): 20 TABLET, DELAYED RELEASE ORAL at 05:24

## 2021-02-24 RX ADMIN — MUPIROCIN 1 APPLICATION(S): 20 OINTMENT TOPICAL at 17:34

## 2021-02-24 RX ADMIN — OXYCODONE AND ACETAMINOPHEN 2 TABLET(S): 5; 325 TABLET ORAL at 15:40

## 2021-02-24 RX ADMIN — ENOXAPARIN SODIUM 30 MILLIGRAM(S): 100 INJECTION SUBCUTANEOUS at 17:34

## 2021-02-24 RX ADMIN — HYDROMORPHONE HYDROCHLORIDE 30 MILLILITER(S): 2 INJECTION INTRAMUSCULAR; INTRAVENOUS; SUBCUTANEOUS at 07:11

## 2021-02-24 RX ADMIN — ENOXAPARIN SODIUM 30 MILLIGRAM(S): 100 INJECTION SUBCUTANEOUS at 05:24

## 2021-02-24 RX ADMIN — FAMOTIDINE 20 MILLIGRAM(S): 10 INJECTION INTRAVENOUS at 13:21

## 2021-02-24 RX ADMIN — Medication 10 UNIT(S): at 12:10

## 2021-02-24 RX ADMIN — HYDROMORPHONE HYDROCHLORIDE 4 MILLIGRAM(S): 2 INJECTION INTRAMUSCULAR; INTRAVENOUS; SUBCUTANEOUS at 19:44

## 2021-02-24 RX ADMIN — OXYCODONE AND ACETAMINOPHEN 2 TABLET(S): 5; 325 TABLET ORAL at 09:45

## 2021-02-24 RX ADMIN — INSULIN GLARGINE 15 UNIT(S): 100 INJECTION, SOLUTION SUBCUTANEOUS at 21:36

## 2021-02-24 RX ADMIN — Medication 100 MILLIGRAM(S): at 21:35

## 2021-02-24 RX ADMIN — Medication 1: at 17:35

## 2021-02-24 RX ADMIN — MUPIROCIN 1 APPLICATION(S): 20 OINTMENT TOPICAL at 05:24

## 2021-02-24 RX ADMIN — Medication 100 MILLIGRAM(S): at 13:22

## 2021-02-24 RX ADMIN — CHLORHEXIDINE GLUCONATE 1 APPLICATION(S): 213 SOLUTION TOPICAL at 12:14

## 2021-02-24 RX ADMIN — HYDROMORPHONE HYDROCHLORIDE 30 MILLILITER(S): 2 INJECTION INTRAMUSCULAR; INTRAVENOUS; SUBCUTANEOUS at 06:19

## 2021-02-24 RX ADMIN — POLYETHYLENE GLYCOL 3350 17 GRAM(S): 17 POWDER, FOR SOLUTION ORAL at 13:23

## 2021-02-24 NOTE — PROGRESS NOTE ADULT - SUBJECTIVE AND OBJECTIVE BOX
S:  c/o dizziness after using toilet.  Feels better now    Telemetry:   70-90    Vital Signs Last 24 Hrs  T(C): 36.7 (21 @ 11:08), Max: 36.8 (21 @ 23:09)  T(F): 98.1 (21 @ 11:08), Max: 98.3 (21 @ 03:23)  HR: 82 (21 @ 11:08) (82 - 90)  BP: 114/56 (21 @ 11:08) (114/56 - 130/60)  RR: 18 (21 @ 11:08) (18 - 20)  SpO2: 97% (21 @ 11:08) (93% - 97%)                    @ 07:01  -   @ 07:00  --------------------------------------------------------  IN: 1991 mL / OUT: 545 mL / NET: 1446 mL     @ 07:01  -   @ 11:52  --------------------------------------------------------  IN: 160 mL / OUT: 350 mL / NET: -190 mL    Daily Weight in k.3 (2021 07:30)            POCT Blood Glucose.: 252 mg/dL (2021 07:38)  POCT Blood Glucose.: 187 mg/dL (2021 04:55)  POCT Blood Glucose.: 120 mg/dL (2021 21:12)  POCT Blood Glucose.: 139 mg/dL (2021 20:02)  POCT Blood Glucose.: 132 mg/dL (2021 19:11)  POCT Blood Glucose.: 136 mg/dL (2021 18:20)  POCT Blood Glucose.: 142 mg/dL (2021 17:10)  POCT Blood Glucose.: 166 mg/dL (2021 16:15)  POCT Blood Glucose.: 157 mg/dL (2021 15:22)  POCT Blood Glucose.: 167 mg/dL (2021 14:20)  POCT Blood Glucose.: 174 mg/dL (2021 13:31)          Drains:     MS         [ x ] Drainage:   80cc/24hrs              L Pleural  [  ]  Drainage:                R Pleural  [  ]  Drainage:    Pacing Wires        [ x ]   Settings:                                  Isolated  [  ]    Coumadin    [ ] YES          [ x ]      NO         Reason:                                 8.7    16.99 )-----------( 150      ( 2021 06:20 )             29.2       02    137  |  101  |  29<H>  ----------------------------<  172<H>  4.7   |  22  |  0.78    Ca    8.9      2021 06:20  Phos  4.8       Mg     1.9         TPro  6.6  /  Alb  3.5  /  TBili  0.5  /  DBili  x   /  AST  58<H>  /  ALT  47<H>  /  AlkPhos  55  24      PT/INR - ( 2021 12:44 )   PT: 15.2 sec;   INR: 1.28 ratio         PTT - ( 2021 12:44 )  PTT:30.6 sec    PHYSICAL EXAM:    Neurology: alert and oriented x 3, nonfocal, no gross deficits    CV :  S1S2 heard RRR    Sternal Wound :  CDI , Stable    Lungs:  clear to ausc.  No w/r/r    Abdomen: soft, nontender, nondistended, positive bowel sounds,             Extremities:  no edema              aspirin  chewable 81 milliGRAM(s) Oral daily  chlorhexidine 2% Cloths 1 Application(s) Topical <User Schedule>  dextrose 5%. 1000 milliLiter(s) IV Continuous <Continuous>  dextrose 50% Injectable 50 milliLiter(s) IV Push every 15 minutes  dextrose 50% Injectable 25 milliLiter(s) IV Push every 15 minutes  enoxaparin Injectable 30 milliGRAM(s) SubCutaneous every 12 hours  famotidine    Tablet 20 milliGRAM(s) Oral daily  influenza   Vaccine 0.5 milliLiter(s) IntraMuscular once  insulin glargine Injectable (LANTUS) 10 Unit(s) SubCutaneous at bedtime  insulin regular Infusion 3 Unit(s)/Hr IV Continuous <Continuous>  labetalol 100 milliGRAM(s) Oral every 8 hours  metoclopramide 5 milliGRAM(s) Oral three times a day  mupirocin 2% Ointment 1 Application(s) Both Nostrils two times a day  naloxone Injectable 0.1 milliGRAM(s) IV Push every 3 minutes PRN  norepinephrine Infusion 0.05 MICROgram(s)/kG/Min IV Continuous <Continuous>  ondansetron Injectable 4 milliGRAM(s) IV Push every 6 hours PRN  oxycodone    5 mG/acetaminophen 325 mG 2 Tablet(s) Oral every 6 hours PRN  pantoprazole    Tablet 40 milliGRAM(s) Oral before breakfast  polyethylene glycol 3350 17 Gram(s) Oral daily  sodium chloride 0.9%. 1000 milliLiter(s) IV Continuous <Continuous>                              Physical Therapy Rec:   Home  [  ]   Home w/ PT  [  ]  Rehab  [  ]    Discussed with Cardiothoracic Team at AM rounds.

## 2021-02-24 NOTE — CONSULT NOTE ADULT - PROBLEM SELECTOR RECOMMENDATION 9
Will increase Lantus to 15u at bedtime.  Will start Admelog 12u before each meal as well as Admelog correction scale coverage ac/hs. Will continue monitoring FS and FU.  Patient counseled for compliance with consistent low carb diet and exercise as tolerated outpatient.

## 2021-02-24 NOTE — CONSULT NOTE ADULT - ASSESSMENT
Assessment  DMT2: 45y Female with DM T2 with hyperglycemia, A1C 6.3%, was on oral meds at home, now postop transitioned to subQ insulin, blood sugars running high and not at target, no hypoglycemic episodes, eating meals, appears comfortable.  Aortic Aneurysm: postop, on medications, no chest pain, stable, monitored.  Thyroid Nodules: seen on imaging, heterogeneous enlarged left thyroid lobe containing nodules as well as additional small nodules in the right thyroid lobe. Patient reports she has had thyroid ultrasound as well as biopsy in the past, ?years ago, reportedly was benign.  Obesity: No strict exercise routines, not on any weight loss program, neither on low calorie diet.      Marley Amado MD  Cell: [phone]  Office: [phone]

## 2021-02-24 NOTE — PROGRESS NOTE ADULT - ASSESSMENT
46 y/o woman, h/o lumbar meningocele, left iliac stent, DM (on Metformin and glimepiride), lymphedema, c/o chest pain radiating to jaw and abdomen and back, dizziness, nausea and vomiting today.  No SOB/fever/cough/syncope.  Pt had COVID-19 in March 2020 and says she tested positive for COVID-19 Ab after that.  pt underwent a ct scan angio which showed a Type A dissection . pt transferred for emergent surgery.   2/22/21  s/p Repair of Type A Aortic dissection with 28mm [place] ascending aorta interposition graft.  Resuspension of the aortic valve.  Aortic cross clamp time of 101 minutes.  Right axillary cutdown with 8mm graft to the axillary artery for arterial cannulation  Hypotension, elevated lactate, on norepinephrine, iv fluid resuscitation , multiple blood products  Correction in bp and decrease in lactate. PCA for pain control.  Ins gtt  2/23 transferred to sdu  2/24 HD stable.  Med 85cc/24.  PCA- dc'd

## 2021-02-24 NOTE — PROGRESS NOTE ADULT - SUBJECTIVE AND OBJECTIVE BOX
Day 2\1 of Anesthesia Pain Management Service    SUBJECTIVE: I feel weak    Pain Scale Score:	[X] Refer to charted pain scores    THERAPY:    [ ] IV PCA Morphine		        [ ] 5 mg/mL	[ ] 1 mg/mL  [X] IV PCA Hydromorphone	[ ] 5 mg/mL	[X] 1 mg/mL  [ ] IV PCA Fentanyl		        [ ] 50 micrograms/mL    Demand dose: 0.25 mg     Lockout: 6 minutes   Continuous Rate: 0 mg/hr  4 Hour Limit: 4 mg    MEDICATIONS  (STANDING):  aspirin  chewable 81 milliGRAM(s) Oral daily  chlorhexidine 2% Cloths 1 Application(s) Topical <User Schedule>  dextrose 5%. 1000 milliLiter(s) (15 mL/Hr) IV Continuous <Continuous>  dextrose 50% Injectable 50 milliLiter(s) IV Push every 15 minutes  dextrose 50% Injectable 25 milliLiter(s) IV Push every 15 minutes  enoxaparin Injectable 30 milliGRAM(s) SubCutaneous every 12 hours  famotidine    Tablet 20 milliGRAM(s) Oral daily  influenza   Vaccine 0.5 milliLiter(s) IntraMuscular once  insulin glargine Injectable (LANTUS) 10 Unit(s) SubCutaneous at bedtime  insulin regular Infusion 3 Unit(s)/Hr (3 mL/Hr) IV Continuous <Continuous>  labetalol 100 milliGRAM(s) Oral every 8 hours  mupirocin 2% Ointment 1 Application(s) Both Nostrils two times a day  norepinephrine Infusion 0.05 MICROgram(s)/kG/Min (12.6 mL/Hr) IV Continuous <Continuous>  pantoprazole    Tablet 40 milliGRAM(s) Oral before breakfast  polyethylene glycol 3350 17 Gram(s) Oral daily  sodium chloride 0.9%. 1000 milliLiter(s) (10 mL/Hr) IV Continuous <Continuous>    MEDICATIONS  (PRN):  naloxone Injectable 0.1 milliGRAM(s) IV Push every 3 minutes PRN For ANY of the following changes in patient status:  A. RR LESS THAN 10 breaths per minute, B. Oxygen saturation LESS THAN 90%, C. Sedation score of 6  ondansetron Injectable 4 milliGRAM(s) IV Push every 6 hours PRN Nausea      OBJECTIVE:    Sedation Score:	[ X] Alert	 [ ] Drowsy 	[ ] Arousable	[ ] Asleep	[ ] Unresponsive    Side Effects:	[X ] None	[ ] Nausea	[ ] Vomiting	[ ] Pruritus  		[ ] Other:    Vital Signs Last 24 Hrs  T(C): 36.7 (24 Feb 2021 07:30), Max: 36.8 (23 Feb 2021 23:09)  T(F): 98.1 (24 Feb 2021 07:30), Max: 98.3 (24 Feb 2021 03:23)  HR: 89 (24 Feb 2021 07:30) (83 - 93)  BP: 130/60 (24 Feb 2021 07:30) (96/54 - 132/72)  BP(mean): 86 (24 Feb 2021 03:23) (83 - 96)  RR: 20 (24 Feb 2021 07:30) (16 - 26)  SpO2: 93% (24 Feb 2021 07:30) (93% - 98%)    ASSESSMENT/ PLAN    Therapy to  be:               [  ] Continued   [X ] Discontinued   [ X] Changed to PRN Analgesics    Documentation and Verification of current medications:   [X] Done	[ ] Not done, not eligible    Comments: PCA D\C'd by surgical service

## 2021-02-24 NOTE — PROGRESS NOTE ADULT - PROBLEM SELECTOR PLAN 1
BP control  DC PCA switch to oral pain meds.  No toradol due to ibuprofen allergy  IV tylenol and percocet  Ambulate and possible remove Mediastinal tube in afternoon

## 2021-02-24 NOTE — CONSULT NOTE ADULT - PROBLEM SELECTOR RECOMMENDATION 2
Will check TFTs. Patient will likely require thyroid US, this can be done as outpatient. Will continue monitoring and FU.

## 2021-02-24 NOTE — CONSULT NOTE ADULT - SUBJECTIVE AND OBJECTIVE BOX
HPI:  44 y/o woman, h/o lumbar meningocele, left iliac stent, DM (on Metformin and glimepiride), lymphedema, c/o chest pain radiating to jaw and abdomen and back, dizziness, nausea and vomiting today.  No SOB/fever/cough/syncope.  Pt had COVID-19 in March 2020 and says she tested positive for COVID-19 Ab after that.  pt underwent a ct scan angio which showed a Type A dissection . pt transferred for emergent surgery.  (22 Feb 2021 03:54)    Patient has history of diabetes, A1C 6.3%, on oral meds at home (Metformin, Glimepiride 2mg BID), reports generally well-controlled blood sugars, no recent hypoglycemic episodes, no polyuria polydipsia. Patient follows up with PCP for diabetes management.  Endo was consulted for postoperative glycemic control.    PAST MEDICAL & SURGICAL HISTORY:  Meningocele    Lymphedema    DM (diabetes mellitus)    No significant past surgical history        FAMILY HISTORY:      Social History:    Outpatient Medications:    MEDICATIONS  (STANDING):  acetaminophen  IVPB .. 1000 milliGRAM(s) IV Intermittent once  aspirin  chewable 81 milliGRAM(s) Oral daily  chlorhexidine 2% Cloths 1 Application(s) Topical <User Schedule>  dextrose 40% Gel 15 Gram(s) Oral once  dextrose 5%. 1000 milliLiter(s) (15 mL/Hr) IV Continuous <Continuous>  dextrose 50% Injectable 50 milliLiter(s) IV Push every 15 minutes  dextrose 50% Injectable 25 milliLiter(s) IV Push every 15 minutes  enoxaparin Injectable 30 milliGRAM(s) SubCutaneous every 12 hours  famotidine    Tablet 20 milliGRAM(s) Oral daily  glucagon  Injectable 1 milliGRAM(s) IntraMuscular once  influenza   Vaccine 0.5 milliLiter(s) IntraMuscular once  insulin glargine Injectable (LANTUS) 15 Unit(s) SubCutaneous at bedtime  insulin lispro (ADMELOG) corrective regimen sliding scale   SubCutaneous three times a day before meals  insulin lispro (ADMELOG) corrective regimen sliding scale   SubCutaneous at bedtime  insulin lispro Injectable (ADMELOG) 12 Unit(s) SubCutaneous three times a day before meals  labetalol 100 milliGRAM(s) Oral every 8 hours  metoclopramide 5 milliGRAM(s) Oral three times a day  mupirocin 2% Ointment 1 Application(s) Both Nostrils two times a day  norepinephrine Infusion 0.05 MICROgram(s)/kG/Min (12.6 mL/Hr) IV Continuous <Continuous>  pantoprazole    Tablet 40 milliGRAM(s) Oral before breakfast  polyethylene glycol 3350 17 Gram(s) Oral daily  sodium chloride 0.9%. 1000 milliLiter(s) (10 mL/Hr) IV Continuous <Continuous>    MEDICATIONS  (PRN):  naloxone Injectable 0.1 milliGRAM(s) IV Push every 3 minutes PRN For ANY of the following changes in patient status:  A. RR LESS THAN 10 breaths per minute, B. Oxygen saturation LESS THAN 90%, C. Sedation score of 6  ondansetron Injectable 4 milliGRAM(s) IV Push every 6 hours PRN Nausea  oxycodone    5 mG/acetaminophen 325 mG 2 Tablet(s) Oral every 6 hours PRN Severe Pain (7 - 10)      Allergies    ibuprofen (Eye Irritation; Swelling)    Intolerances      Review of Systems:  Constitutional: No fever, no chills  Eyes: No blurry vision  Neuro: No tremors  HEENT: No pain, no neck swelling  Cardiovascular: No chest pain, no palpitations  Respiratory: Has SOB, no cough  GI: No nausea, vomiting, abdominal pain  : No dysuria  Skin: no rash  MSK: Has leg swelling.  Psych: no depression  Endocrine: no polyuria, polydipsia    ALL OTHER SYSTEMS REVIEWED AND NEGATIVE    UNABLE TO OBTAIN    PHYSICAL EXAM:  VITALS: T(C): 37 (02-24-21 @ 15:13)  T(F): 98.6 (02-24-21 @ 15:13), Max: 98.6 (02-24-21 @ 15:13)  HR: 83 (02-24-21 @ 15:13) (82 - 90)  BP: 114/55 (02-24-21 @ 15:13) (114/55 - 130/60)  RR:  (18 - 20)  SpO2:  (92% - 97%)  Wt(kg): --  GENERAL: NAD, well-groomed, well-developed  EYES: No proptosis, no lid lag  HEENT:  Atraumatic, Normocephalic  THYROID: nodules  RESPIRATORY: Clear to auscultation bilaterally; No rales, rhonchi, wheezing  CARDIOVASCULAR: Si S2, No murmurs;  GI: Soft, non distended, normal bowel sounds  SKIN: Dry, intact, No rashes or lesions  MUSCULOSKELETAL: Has BL lower extremity edema.  NEURO:  no tremor, sensation decreased in feet BL,    POCT Blood Glucose.: 197 mg/dL (02-24-21 @ 15:51)  POCT Blood Glucose.: 270 mg/dL (02-24-21 @ 12:07)  POCT Blood Glucose.: 252 mg/dL (02-24-21 @ 07:38)  POCT Blood Glucose.: 187 mg/dL (02-24-21 @ 04:55)  POCT Blood Glucose.: 120 mg/dL (02-23-21 @ 21:12)  POCT Blood Glucose.: 139 mg/dL (02-23-21 @ 20:02)  POCT Blood Glucose.: 132 mg/dL (02-23-21 @ 19:11)  POCT Blood Glucose.: 136 mg/dL (02-23-21 @ 18:20)  POCT Blood Glucose.: 142 mg/dL (02-23-21 @ 17:10)  POCT Blood Glucose.: 166 mg/dL (02-23-21 @ 16:15)  POCT Blood Glucose.: 157 mg/dL (02-23-21 @ 15:22)  POCT Blood Glucose.: 167 mg/dL (02-23-21 @ 14:20)  POCT Blood Glucose.: 174 mg/dL (02-23-21 @ 13:31)  POCT Blood Glucose.: 188 mg/dL (02-23-21 @ 11:50)  POCT Blood Glucose.: 158 mg/dL (02-23-21 @ 11:01)  POCT Blood Glucose.: 152 mg/dL (02-23-21 @ 10:35)  POCT Blood Glucose.: 129 mg/dL (02-23-21 @ 09:06)  POCT Blood Glucose.: 128 mg/dL (02-23-21 @ 07:56)  POCT Blood Glucose.: 131 mg/dL (02-23-21 @ 06:49)  POCT Blood Glucose.: 152 mg/dL (02-23-21 @ 06:09)  POCT Blood Glucose.: 141 mg/dL (02-23-21 @ 05:09)  POCT Blood Glucose.: 132 mg/dL (02-23-21 @ 03:46)  POCT Blood Glucose.: 141 mg/dL (02-23-21 @ 02:44)  POCT Blood Glucose.: 145 mg/dL (02-23-21 @ 02:03)  POCT Blood Glucose.: 163 mg/dL (02-22-21 @ 23:56)  POCT Blood Glucose.: 181 mg/dL (02-22-21 @ 21:48)  POCT Blood Glucose.: 223 mg/dL (02-22-21 @ 18:43)  POCT Blood Glucose.: 216 mg/dL (02-22-21 @ 17:59)  POCT Blood Glucose.: 222 mg/dL (02-22-21 @ 17:23)  POCT Blood Glucose.: 221 mg/dL (02-22-21 @ 15:54)  POCT Blood Glucose.: 236 mg/dL (02-22-21 @ 14:53)                            8.7    16.99 )-----------( 150      ( 24 Feb 2021 06:20 )             29.2       02-24    137  |  101  |  29<H>  ----------------------------<  172<H>  4.7   |  22  |  0.78    EGFR if : 105  EGFR if non : 91    Ca    8.9      02-24  Mg     1.9     02-23  Phos  4.8     02-23    TPro  6.6  /  Alb  3.5  /  TBili  0.5  /  DBili  x   /  AST  58<H>  /  ALT  47<H>  /  AlkPhos  55  02-24      Thyroid Function Tests:              Radiology:                    HPI:  46 y/o woman, h/o lumbar meningocele, left iliac stent, DM (on Metformin and glimepiride), lymphedema, c/o chest pain radiating to jaw and abdomen and back, dizziness, nausea and vomiting today.  No SOB/fever/cough/syncope.  Pt had COVID-19 in March 2020 and says she tested positive for COVID-19 Ab after that.  pt underwent a ct scan angio which showed a Type A dissection . pt transferred for emergent surgery.  (22 Feb 2021 03:54)    Patient has history of diabetes, A1C 6.3%, on oral meds at home (Metformin, Glimepiride 2mg BID), reports generally well-controlled blood sugars, no recent hypoglycemic episodes, no polyuria polydipsia. Patient follows up with PCP for diabetes management.  Endo was consulted for postoperative glycemic control.    PAST MEDICAL & SURGICAL HISTORY:  Meningocele    Lymphedema    DM (diabetes mellitus)    No significant past surgical history        FAMILY HISTORY:      Social History:    Outpatient Medications:    MEDICATIONS  (STANDING):  acetaminophen  IVPB .. 1000 milliGRAM(s) IV Intermittent once  aspirin  chewable 81 milliGRAM(s) Oral daily  chlorhexidine 2% Cloths 1 Application(s) Topical <User Schedule>  dextrose 40% Gel 15 Gram(s) Oral once  dextrose 5%. 1000 milliLiter(s) (15 mL/Hr) IV Continuous <Continuous>  dextrose 50% Injectable 50 milliLiter(s) IV Push every 15 minutes  dextrose 50% Injectable 25 milliLiter(s) IV Push every 15 minutes  enoxaparin Injectable 30 milliGRAM(s) SubCutaneous every 12 hours  famotidine    Tablet 20 milliGRAM(s)  Oral daily  glucagon  Injectable 1 milliGRAM(s) IntraMuscular once  influenza   Vaccine 0.5 milliLiter(s) IntraMuscular once  insulin glargine Injectable (LANTUS) 15 Unit(s) SubCutaneous at bedtime  insulin lispro (ADMELOG) corrective regimen sliding scale   SubCutaneous three times a day before meals  insulin lispro (ADMELOG) corrective regimen sliding scale   SubCutaneous at bedtime  insulin lispro Injectable (ADMELOG) 12 Unit(s) SubCutaneous three times a day before meals  labetalol 100 milliGRAM(s) Oral every 8 hours  metoclopramide 5 milliGRAM(s) Oral three times a day  mupirocin 2% Ointment 1 Application(s) Both Nostrils two times a day  norepinephrine Infusion 0.05 MICROgram(s)/kG/Min (12.6 mL/Hr) IV Continuous <Continuous>  pantoprazole    Tablet 40 milliGRAM(s) Oral before breakfast  polyethylene glycol 3350 17 Gram(s) Oral daily  sodium chloride 0.9%. 1000 milliLiter(s) (10 mL/Hr) IV Continuous <Continuous>    MEDICATIONS  (PRN):  naloxone Injectable 0.1 milliGRAM(s) IV Push every 3 minutes PRN For ANY of the following changes in patient status:  A. RR LESS THAN 10 breaths per minute, B. Oxygen saturation LESS THAN 90%, C. Sedation score of 6  ondansetron Injectable 4 milliGRAM(s) IV Push every 6 hours PRN Nausea  oxycodone    5 mG/acetaminophen 325 mG 2 Tablet(s) Oral every 6 hours PRN Severe Pain (7 - 10)      Allergies    ibuprofen (Eye Irritation; Swelling)    Intolerances      Review of Systems:  Constitutional: No fever, no chills  Eyes: No blurry vision  Neuro: No tremors  HEENT: No pain, no neck swelling  Cardiovascular: No chest pain, no palpitations  Respiratory: Has SOB, no cough  GI: No nausea, vomiting, abdominal pain  : No dysuria  Skin: no rash  MSK: Has leg swelling.  Psych: no depression  Endocrine: no polyuria, polydipsia    ALL OTHER SYSTEMS REVIEWED AND NEGATIVE    UNABLE TO OBTAIN    PHYSICAL EXAM:  VITALS: T(C): 37 (02-24-21 @ 15:13)  T(F): 98.6 (02-24-21 @ 15:13), Max: 98.6 (02-24-21 @ 15:13)  HR: 83 (02-24-21 @ 15:13) (82 - 90)  BP: 114/55 (02-24-21 @ 15:13) (114/55 - 130/60)  RR:  (18 - 20)  SpO2:  (92% - 97%)  Wt(kg): --  GENERAL: NAD, well-groomed, well-developed  EYES: No proptosis, no lid lag  HEENT:  Atraumatic, Normocephalic  THYROID: nodules  RESPIRATORY: Clear to auscultation bilaterally; No rales, rhonchi, wheezing  CARDIOVASCULAR: Si S2, No murmurs;  GI: Soft, non distended, normal bowel sounds  SKIN: Dry, intact, No rashes or lesions  MUSCULOSKELETAL: Has BL lower extremity edema.  NEURO:  no tremor, sensation decreased in feet BL,    POCT Blood Glucose.: 197 mg/dL (02-24-21 @ 15:51)  POCT Blood Glucose.: 270 mg/dL (02-24-21 @ 12:07)  POCT Blood Glucose.: 252 mg/dL (02-24-21 @ 07:38)  POCT Blood Glucose.: 187 mg/dL (02-24-21 @ 04:55)  POCT Blood Glucose.: 120 mg/dL (02-23-21 @ 21:12)  POCT Blood Glucose.: 139 mg/dL (02-23-21 @ 20:02)  POCT Blood Glucose.: 132 mg/dL (02-23-21 @ 19:11)  POCT Blood Glucose.: 136 mg/dL (02-23-21 @ 18:20)  POCT Blood Glucose.: 142 mg/dL (02-23-21 @ 17:10)  POCT Blood Glucose.: 166 mg/dL (02-23-21 @ 16:15)  POCT Blood Glucose.: 157 mg/dL (02-23-21 @ 15:22)  POCT Blood Glucose.: 167 mg/dL (02-23-21 @ 14:20)  POCT Blood Glucose.: 174 mg/dL (02-23-21 @ 13:31)  POCT Blood Glucose.: 188 mg/dL (02-23-21 @ 11:50)  POCT Blood Glucose.: 158 mg/dL (02-23-21 @ 11:01)  POCT Blood Glucose.: 152 mg/dL (02-23-21 @ 10:35)  POCT Blood Glucose.: 129 mg/dL (02-23-21 @ 09:06)  POCT Blood Glucose.: 128 mg/dL (02-23-21 @ 07:56)  POCT Blood Glucose.: 131 mg/dL (02-23-21 @ 06:49)  POCT Blood Glucose.: 152 mg/dL (02-23-21 @ 06:09)  POCT Blood Glucose.: 141 mg/dL (02-23-21 @ 05:09)  POCT Blood Glucose.: 132 mg/dL (02-23-21 @ 03:46)  POCT Blood Glucose.: 141 mg/dL (02-23-21 @ 02:44)  POCT Blood Glucose.: 145 mg/dL (02-23-21 @ 02:03)  POCT Blood Glucose.: 163 mg/dL (02-22-21 @ 23:56)  POCT Blood Glucose.: 181 mg/dL (02-22-21 @ 21:48)  POCT Blood Glucose.: 223 mg/dL (02-22-21 @ 18:43)  POCT Blood Glucose.: 216 mg/dL (02-22-21 @ 17:59)  POCT Blood Glucose.: 222 mg/dL (02-22-21 @ 17:23)  POCT Blood Glucose.: 221 mg/dL (02-22-21 @ 15:54)  POCT Blood Glucose.: 236 mg/dL (02-22-21 @ 14:53)                            8.7    16.99 )-----------( 150      ( 24 Feb 2021 06:20 )             29.2       02-24    137  |  101  |  29<H>  ----------------------------<  172<H>  4.7   |  22  |  0.78    EGFR if : 105  EGFR if non : 91    Ca    8.9      02-24  Mg     1.9     02-23  Phos  4.8     02-23    TPro  6.6  /  Alb  3.5  /  TBili  0.5  /  DBili  x   /  AST  58<H>  /  ALT  47<H>  /  AlkPhos  55  02-24      Thyroid Function Tests:              Radiology:

## 2021-02-25 LAB
ANION GAP SERPL CALC-SCNC: 11 MMOL/L — SIGNIFICANT CHANGE UP (ref 5–17)
BUN SERPL-MCNC: 20 MG/DL — SIGNIFICANT CHANGE UP (ref 7–23)
CALCIUM SERPL-MCNC: 8.6 MG/DL — SIGNIFICANT CHANGE UP (ref 8.4–10.5)
CHLORIDE SERPL-SCNC: 101 MMOL/L — SIGNIFICANT CHANGE UP (ref 96–108)
CO2 SERPL-SCNC: 25 MMOL/L — SIGNIFICANT CHANGE UP (ref 22–31)
CREAT SERPL-MCNC: 0.51 MG/DL — SIGNIFICANT CHANGE UP (ref 0.5–1.3)
GLUCOSE BLDC GLUCOMTR-MCNC: 133 MG/DL — HIGH (ref 70–99)
GLUCOSE BLDC GLUCOMTR-MCNC: 139 MG/DL — HIGH (ref 70–99)
GLUCOSE BLDC GLUCOMTR-MCNC: 146 MG/DL — HIGH (ref 70–99)
GLUCOSE BLDC GLUCOMTR-MCNC: 215 MG/DL — HIGH (ref 70–99)
GLUCOSE SERPL-MCNC: 167 MG/DL — HIGH (ref 70–99)
HCT VFR BLD CALC: 24.9 % — LOW (ref 34.5–45)
HGB BLD-MCNC: 7.5 G/DL — LOW (ref 11.5–15.5)
MCHC RBC-ENTMCNC: 24.8 PG — LOW (ref 27–34)
MCHC RBC-ENTMCNC: 30.1 GM/DL — LOW (ref 32–36)
MCV RBC AUTO: 82.2 FL — SIGNIFICANT CHANGE UP (ref 80–100)
NRBC # BLD: 0 /100 WBCS — SIGNIFICANT CHANGE UP (ref 0–0)
PLATELET # BLD AUTO: 133 K/UL — LOW (ref 150–400)
POTASSIUM SERPL-MCNC: 4.3 MMOL/L — SIGNIFICANT CHANGE UP (ref 3.5–5.3)
POTASSIUM SERPL-SCNC: 4.3 MMOL/L — SIGNIFICANT CHANGE UP (ref 3.5–5.3)
RBC # BLD: 3.03 M/UL — LOW (ref 3.8–5.2)
RBC # FLD: 16.4 % — HIGH (ref 10.3–14.5)
SARS-COV-2 RNA SPEC QL NAA+PROBE: SIGNIFICANT CHANGE UP
SODIUM SERPL-SCNC: 137 MMOL/L — SIGNIFICANT CHANGE UP (ref 135–145)
T4 FREE SERPL-MCNC: 1.2 NG/DL — SIGNIFICANT CHANGE UP (ref 0.9–1.8)
TSH SERPL-MCNC: 0.47 UIU/ML — SIGNIFICANT CHANGE UP (ref 0.27–4.2)
WBC # BLD: 10.91 K/UL — HIGH (ref 3.8–10.5)
WBC # FLD AUTO: 10.91 K/UL — HIGH (ref 3.8–10.5)

## 2021-02-25 PROCEDURE — 71045 X-RAY EXAM CHEST 1 VIEW: CPT | Mod: 26

## 2021-02-25 RX ORDER — FERROUS SULFATE 325(65) MG
325 TABLET ORAL
Refills: 0 | Status: DISCONTINUED | OUTPATIENT
Start: 2021-02-25 | End: 2021-02-28

## 2021-02-25 RX ORDER — INSULIN GLARGINE 100 [IU]/ML
23 INJECTION, SOLUTION SUBCUTANEOUS AT BEDTIME
Refills: 0 | Status: DISCONTINUED | OUTPATIENT
Start: 2021-02-25 | End: 2021-02-27

## 2021-02-25 RX ORDER — SORBITOL SOLUTION 70 %
30 SOLUTION, ORAL MISCELLANEOUS DAILY
Refills: 0 | Status: DISCONTINUED | OUTPATIENT
Start: 2021-02-25 | End: 2021-02-28

## 2021-02-25 RX ORDER — FOLIC ACID 0.8 MG
1 TABLET ORAL DAILY
Refills: 0 | Status: DISCONTINUED | OUTPATIENT
Start: 2021-02-25 | End: 2021-02-28

## 2021-02-25 RX ADMIN — MUPIROCIN 1 APPLICATION(S): 20 OINTMENT TOPICAL at 17:20

## 2021-02-25 RX ADMIN — Medication 325 MILLIGRAM(S): at 17:20

## 2021-02-25 RX ADMIN — Medication 12 UNIT(S): at 08:34

## 2021-02-25 RX ADMIN — MUPIROCIN 1 APPLICATION(S): 20 OINTMENT TOPICAL at 05:33

## 2021-02-25 RX ADMIN — Medication 100 MILLIGRAM(S): at 20:34

## 2021-02-25 RX ADMIN — Medication 325 MILLIGRAM(S): at 12:47

## 2021-02-25 RX ADMIN — HYDROMORPHONE HYDROCHLORIDE 4 MILLIGRAM(S): 2 INJECTION INTRAMUSCULAR; INTRAVENOUS; SUBCUTANEOUS at 22:26

## 2021-02-25 RX ADMIN — HYDROMORPHONE HYDROCHLORIDE 4 MILLIGRAM(S): 2 INJECTION INTRAMUSCULAR; INTRAVENOUS; SUBCUTANEOUS at 08:51

## 2021-02-25 RX ADMIN — ENOXAPARIN SODIUM 30 MILLIGRAM(S): 100 INJECTION SUBCUTANEOUS at 05:34

## 2021-02-25 RX ADMIN — Medication 81 MILLIGRAM(S): at 12:49

## 2021-02-25 RX ADMIN — Medication 12 UNIT(S): at 12:00

## 2021-02-25 RX ADMIN — Medication 100 MILLIGRAM(S): at 05:34

## 2021-02-25 RX ADMIN — INSULIN GLARGINE 23 UNIT(S): 100 INJECTION, SOLUTION SUBCUTANEOUS at 22:23

## 2021-02-25 RX ADMIN — Medication 100 MILLIGRAM(S): at 12:49

## 2021-02-25 RX ADMIN — Medication 2: at 08:34

## 2021-02-25 RX ADMIN — FAMOTIDINE 20 MILLIGRAM(S): 10 INJECTION INTRAVENOUS at 12:49

## 2021-02-25 RX ADMIN — POLYETHYLENE GLYCOL 3350 17 GRAM(S): 17 POWDER, FOR SOLUTION ORAL at 12:49

## 2021-02-25 RX ADMIN — PANTOPRAZOLE SODIUM 40 MILLIGRAM(S): 20 TABLET, DELAYED RELEASE ORAL at 05:34

## 2021-02-25 RX ADMIN — CHLORHEXIDINE GLUCONATE 1 APPLICATION(S): 213 SOLUTION TOPICAL at 08:00

## 2021-02-25 RX ADMIN — ENOXAPARIN SODIUM 30 MILLIGRAM(S): 100 INJECTION SUBCUTANEOUS at 17:20

## 2021-02-25 RX ADMIN — Medication 1 MILLIGRAM(S): at 12:49

## 2021-02-25 RX ADMIN — Medication 5 MILLIGRAM(S): at 12:56

## 2021-02-25 RX ADMIN — Medication 30 MILLILITER(S): at 12:50

## 2021-02-25 RX ADMIN — Medication 12 UNIT(S): at 16:49

## 2021-02-25 RX ADMIN — HYDROMORPHONE HYDROCHLORIDE 4 MILLIGRAM(S): 2 INJECTION INTRAMUSCULAR; INTRAVENOUS; SUBCUTANEOUS at 16:49

## 2021-02-25 RX ADMIN — Medication 5 MILLIGRAM(S): at 20:34

## 2021-02-25 RX ADMIN — HYDROMORPHONE HYDROCHLORIDE 0.25 MILLIGRAM(S): 2 INJECTION INTRAMUSCULAR; INTRAVENOUS; SUBCUTANEOUS at 03:56

## 2021-02-25 RX ADMIN — HYDROMORPHONE HYDROCHLORIDE 2 MILLIGRAM(S): 2 INJECTION INTRAMUSCULAR; INTRAVENOUS; SUBCUTANEOUS at 12:50

## 2021-02-25 RX ADMIN — Medication 5 MILLIGRAM(S): at 05:34

## 2021-02-25 NOTE — PROGRESS NOTE ADULT - PROBLEM SELECTOR PLAN 2
insulin gtt dc'd  endocrine following  started on lantus 23 and admelog 12u   home meds were metformin and glimeperide.  HgA1c 6.3

## 2021-02-25 NOTE — PROGRESS NOTE ADULT - ASSESSMENT
Assessment  DMT2: 45y Female with DM T2 with hyperglycemia, A1C 6.3%, was on oral meds at home, postop on basal bolus insulin, increased dose yesterday, blood sugars improving though still running slightly high and not at postop target, no hypoglycemic episodes, eating meals, appears comfortable.  Aortic Aneurysm: postop, on medications, no chest pain, stable, monitored.  Thyroid Nodules: seen on imaging, heterogeneous enlarged left thyroid lobe containing nodules as well as additional small nodules in the right thyroid lobe. Patient reports she has had thyroid ultrasound as well as biopsy in the past, ?years ago, reportedly was benign. TFTs pending.  Obesity: No strict exercise routines, not on any weight loss program, neither on low calorie diet.      Marley Amado MD  Cell: [phone]  Office: [phone]       Assessment  DMT2: 45y Female with DM T2 with hyperglycemia, A1C 6.3%, was on oral meds at home, postop on basal bolus insulin, increased dose yesterday, blood sugars improving though still running slightly high and not at postop target, no hypoglycemic episodes, eating meals, appears comfortable.  Aortic Aneurysm: postop, on medications, no chest pain, stable, monitored.  Thyroid Nodules: seen on imaging, heterogeneous enlarged left thyroid lobe containing nodules as well as additional small nodules  in the right thyroid lobe. Patient reports she has had thyroid ultrasound as well as biopsy in the past, ?years ago, reportedly was benign. TFTs pending.  Obesity: No strict exercise routines, not on any weight loss program, neither on low calorie diet.      Marley Amado MD  Cell: [phone]  Office: [phone]

## 2021-02-25 NOTE — PROGRESS NOTE ADULT - SUBJECTIVE AND OBJECTIVE BOX
Chief complaint  Patient is a 45y old  Female who presents with a chief complaint of Type A dissection (25 Feb 2021 10:39)   Review of systems  Patient in bed, looks comfortable, no hypoglycemic episodes.    Labs and Fingersticks  CAPILLARY BLOOD GLUCOSE      POCT Blood Glucose.: 146 mg/dL (25 Feb 2021 11:29)  POCT Blood Glucose.: 215 mg/dL (25 Feb 2021 07:43)  POCT Blood Glucose.: 161 mg/dL (24 Feb 2021 21:14)  POCT Blood Glucose.: 190 mg/dL (24 Feb 2021 16:51)  POCT Blood Glucose.: 197 mg/dL (24 Feb 2021 15:51)    Medications  MEDICATIONS  (STANDING):  aspirin  chewable 81 milliGRAM(s) Oral daily  chlorhexidine 2% Cloths 1 Application(s) Topical <User Schedule>  dextrose 40% Gel 15 Gram(s) Oral once  dextrose 5%. 1000 milliLiter(s) (15 mL/Hr) IV Continuous <Continuous>  dextrose 50% Injectable 50 milliLiter(s) IV Push every 15 minutes  dextrose 50% Injectable 25 milliLiter(s) IV Push every 15 minutes  enoxaparin Injectable 30 milliGRAM(s) SubCutaneous every 12 hours  famotidine    Tablet 20 milliGRAM(s) Oral daily  ferrous    sulfate 325 milliGRAM(s) Oral two times a day  folic acid 1 milliGRAM(s) Oral daily  glucagon  Injectable 1 milliGRAM(s) IntraMuscular once  influenza   Vaccine 0.5 milliLiter(s) IntraMuscular once  insulin glargine Injectable (LANTUS) 23 Unit(s) SubCutaneous at bedtime  insulin lispro (ADMELOG) corrective regimen sliding scale   SubCutaneous three times a day before meals  insulin lispro (ADMELOG) corrective regimen sliding scale   SubCutaneous at bedtime  insulin lispro Injectable (ADMELOG) 12 Unit(s) SubCutaneous three times a day before meals  labetalol 100 milliGRAM(s) Oral every 8 hours  metoclopramide 5 milliGRAM(s) Oral three times a day  mupirocin 2% Ointment 1 Application(s) Both Nostrils two times a day  pantoprazole    Tablet 40 milliGRAM(s) Oral before breakfast  polyethylene glycol 3350 17 Gram(s) Oral daily  sodium chloride 0.9%. 1000 milliLiter(s) (10 mL/Hr) IV Continuous <Continuous>  sorbitol 70% Solution 30 milliLiter(s) Oral daily      Physical Exam  General: Patient comfortable in bed  Vital Signs Last 12 Hrs  T(F): 97.9 (02-25-21 @ 13:38), Max: 98.4 (02-25-21 @ 03:30)  HR: 87 (02-25-21 @ 13:38) (84 - 90)  BP: 128/78 (02-25-21 @ 13:38) (128/78 - 145/63)  BP(mean): 91 (02-25-21 @ 12:30) (91 - 91)  RR: 18 (02-25-21 @ 13:38) (18 - 19)  SpO2: 89% (02-25-21 @ 13:38) (89% - 97%)      Diagnostics    Free Thyroxine, Serum: AM Sched. Collection: 25-Feb-2021 06:00 (02-24 @ 13:27)  Thyroid Stimulating Hormone, Serum: AM Sched. Collection: 25-Feb-2021 06:00 (02-24 @ 13:27)           Chief complaint  Patient is a 45y old  Female who presents with a chief complaint of Type A dissection (25 Feb 2021 10:39)   Review of systems  Patient in bed, looks comfortable, no hypoglycemic episodes.    Labs and Fingersticks  CAPILLARY BLOOD GLUCOSE      POCT Blood Glucose.: 146 mg/dL (25 Feb 2021 11:29)  POCT Blood Glucose.: 215 mg/dL (25 Feb 2021 07:43)  POCT Blood Glucose.: 161 mg/dL (24 Feb 2021 21:14)  POCT Blood Glucose.: 190 mg/dL (24 Feb 2021 16:51)  POCT Blood Glucose.: 197 mg/dL (24 Feb 2021 15:51)    Medications  MEDICATIONS  (STANDING):  aspirin  chewable 81 milliGRAM(s) Oral daily  chlorhexidine 2% Cloths 1 Application(s) Topical <User Schedule>  dextrose 40% Gel 15 Gram(s) Oral once  dextrose 5%. 1000 milliLiter(s) (15 mL/Hr) IV Continuous <Continuous>  dextrose 50% Injectable 50 milliLiter(s) IV Push every 15 minutes  dextrose 50% Injectable 25 milliLiter(s) IV Push every 15 minutes  enoxaparin Injectable 30 milliGRAM(s) SubCutaneous every 12 hours  famotidine    Tablet 20 milliGRAM(s) Oral daily  ferrous    sulfate 325 milliGRAM(s) Oral two times a day  folic acid 1 milliGRAM(s) Oral daily  glucagon  Injectable 1 milliGRAM(s) IntraMuscular once  influenza   Vaccine 0.5 milliLiter(s) IntraMuscular once  insulin glargine Injectable (LANTUS) 23 Unit(s) SubCutaneous at bedtime  insulin lispro (ADMELOG) corrective regimen sliding scale   SubCutaneous three times a day before meals  insulin lispro (ADMELOG) corrective regimen sliding scale   SubCutaneous at bedtime  insulin lispro Injectable (ADMELOG) 12 Unit(s) SubCutaneous three times a day before meals  labetalol 100 milliGRAM(s) Oral every 8 hours  metoclopramide 5 milliGRAM(s) Oral three times a day  mupirocin 2% Ointment 1 Application(s) Both Nostrils two times a day  pantoprazole    Tablet 40 milliGRAM(s) Oral before breakfast  polyethylene glycol 3350 17 Gram(s) Oral daily  sodium chloride 0.9%. 1000 milliLiter(s) (10 mL/Hr) IV Continuous <Continuous>  sorbitol 70% Solution 30 milliLiter(s) Oral daily      Physical Exam  General: Patient comfortable in bed  Vital Signs Last 12 Hrs  T(F): 97.9 (02-25-21 @ 13:38), Max: 98.4 (02-25-21 @ 03:30)  HR: 87 (02-25-21 @ 13:38) (84 - 90)  BP: 128/78 (02-25-21 @ 13:38) (128/78 - 145/63)  BP(mean): 91 (02-25-21 @ 12:30) (91 - 91)  RR: 18 (02-25-21 @ 13:38) (18 - 19)  SpO2: 89% (02-25-21 @ 13:38) (89% - 97%)      Diagnostics    Free Thyroxine, Serum: AM Sched. Collection: 25-Feb-2021 06:00 (02-24 @ 13:27)  Thyroid Stimulating Hormone, Serum: AM Sched. Collection: 25-Feb-2021 06:00 (02-24 @ 13:27)

## 2021-02-25 NOTE — PROGRESS NOTE ADULT - ASSESSMENT
46 y/o woman, h/o lumbar meningocele, left iliac stent, DM (on Metformin and glimepiride), lymphedema, c/o chest pain radiating to jaw and abdomen and back, dizziness, nausea and vomiting today.  No SOB/fever/cough/syncope.  Pt had COVID-19 in March 2020 and says she tested positive for COVID-19 Ab after that.  pt underwent a ct scan angio which showed a Type A dissection . pt transferred for emergent surgery.   2/22/21  s/p Repair of Type A Aortic dissection with 28mm Hemashield [place] ascending aorta interposition graft.  Resuspension of the aortic valve.  Aortic cross clamp time of 101 minutes.  Right axillary cutdown with 8mm graft to the axillary artery for arterial cannulation  Hypotension, elevated lactate, on norepinephrine, iv fluid resuscitation , multiple blood products  Correction in bp and decrease in lactate. PCA for pain control.  Ins gtt  2/23 transferred to sdu  2/24 HD stable.  Med 85cc/24.  PCA- dc'd  2/25 HD stable.  Mediastinal tube removed.  Transfer to floor

## 2021-02-25 NOTE — PROGRESS NOTE ADULT - SUBJECTIVE AND OBJECTIVE BOX
S:  feels better.  Mild sob on movement.  Pain controlled    Telemetry:  SR 83    Vital Signs Last 24 Hrs  T(C): 36.8 (21 @ 07:48), Max: 37 (21 @ 15:13)  T(F): 98.3 (21 @ 07:48), Max: 98.6 (21 @ 15:13)  HR: 84 (21 07:48) (79 - 88)  BP: 129/61 (21 @ 07:48) (114/55 - 145/63)  RR: 18 (21 @ 07:48) (18 - 18)  SpO2: 96% (21 @ 07:48) (90% - 97%)                    07:01  -   @ 07:00  --------------------------------------------------------  IN: 1330 mL / OUT: 1720 mL / NET: -390 mL     07:01  -   @ 10:40  --------------------------------------------------------  IN: 480 mL / OUT: 320 mL / NET: 160 mL              Daily Weight in k.7 (2021 07:48)          POCT Blood Glucose.: 215 mg/dL (2021 07:43)  POCT Blood Glucose.: 161 mg/dL (2021 21:14)  POCT Blood Glucose.: 190 mg/dL (2021 16:51)  POCT Blood Glucose.: 197 mg/dL (2021 15:51)  POCT Blood Glucose.: 270 mg/dL (2021 12:07)          Drains:     MS         [ x ] Drainage:    20cc/24hrs             L Pleural  [  ]  Drainage:                R Pleural  [  ]  Drainage:    Pacing Wires        [  ]   Settings:                                  Isolated  [ x ]    Coumadin    [ ] YES          [ x ]      NO         Reason:                                 7.5    10.91 )-----------( 133      ( 2021 05:20 )             24.9           137  |  101  |  20  ----------------------------<  167<H>  4.3   |  25  |  0.51    Ca    8.6      2021 05:20    TPro  6.6  /  Alb  3.5  /  TBili  0.5  /  DBili  x   /  AST  58<H>  /  ALT  47<H>  /  AlkPhos  55            PHYSICAL EXAM:    Neurology: alert and oriented x 3, nonfocal, no gross deficits    CV :  S1S2 heard RRR    Sternal Wound :  CDI , Stable    Lungs:  clear to ausc.  No w/r/r    Abdomen: soft, nontender, nondistended, positive bowel sounds, last bowel movement pre-op    :        voiding freely       Extremities:    trace edema           aspirin  chewable 81 milliGRAM(s) Oral daily  chlorhexidine 2% Cloths 1 Application(s) Topical <User Schedule>  dextrose 40% Gel 15 Gram(s) Oral once  dextrose 5%. 1000 milliLiter(s) IV Continuous <Continuous>  dextrose 50% Injectable 50 milliLiter(s) IV Push every 15 minutes  dextrose 50% Injectable 25 milliLiter(s) IV Push every 15 minutes  enoxaparin Injectable 30 milliGRAM(s) SubCutaneous every 12 hours  famotidine    Tablet 20 milliGRAM(s) Oral daily  ferrous    sulfate 325 milliGRAM(s) Oral two times a day  folic acid 1 milliGRAM(s) Oral daily  glucagon  Injectable 1 milliGRAM(s) IntraMuscular once  HYDROmorphone   Tablet 2 milliGRAM(s) Oral every 4 hours PRN  HYDROmorphone   Tablet 4 milliGRAM(s) Oral every 4 hours PRN  influenza   Vaccine 0.5 milliLiter(s) IntraMuscular once  insulin glargine Injectable (LANTUS) 23 Unit(s) SubCutaneous at bedtime  insulin lispro (ADMELOG) corrective regimen sliding scale   SubCutaneous three times a day before meals  insulin lispro (ADMELOG) corrective regimen sliding scale   SubCutaneous at bedtime  insulin lispro Injectable (ADMELOG) 12 Unit(s) SubCutaneous three times a day before meals  labetalol 100 milliGRAM(s) Oral every 8 hours  metoclopramide 5 milliGRAM(s) Oral three times a day  mupirocin 2% Ointment 1 Application(s) Both Nostrils two times a day  ondansetron Injectable 4 milliGRAM(s) IV Push every 6 hours PRN  pantoprazole    Tablet 40 milliGRAM(s) Oral before breakfast  polyethylene glycol 3350 17 Gram(s) Oral daily  sodium chloride 0.9%. 1000 milliLiter(s) IV Continuous <Continuous>  sorbitol 70% Solution 30 milliLiter(s) Oral daily                              Physical Therapy Rec:   Home  [  ]   Home w/ PT  [ x ]  Rehab  [  ]    Discussed with Cardiothoracic Team at AM rounds.

## 2021-02-25 NOTE — PROGRESS NOTE ADULT - PROBLEM SELECTOR PLAN 2
TFTs pending, Patient will likely require thyroid US, this can be done as outpatient. Will continue monitoring and FU.

## 2021-02-25 NOTE — PROGRESS NOTE ADULT - PROBLEM SELECTOR PLAN 1
Will increase Lantus to 23u at bedtime.  Will continue Admelog 12u before each meal as well as Admelog correction scale coverage. Will continue monitoring FS and FU.  Patient has moderate insulin requirement, will likely require insulin as outpatient. Will continue monitoring and make recommendations prior to DC.  Patient counseled for compliance with consistent low carb diet and exercise as tolerated outpatient.

## 2021-02-26 LAB
ANION GAP SERPL CALC-SCNC: 11 MMOL/L — SIGNIFICANT CHANGE UP (ref 5–17)
BLD GP AB SCN SERPL QL: NEGATIVE — SIGNIFICANT CHANGE UP
BUN SERPL-MCNC: 14 MG/DL — SIGNIFICANT CHANGE UP (ref 7–23)
CALCIUM SERPL-MCNC: 8.6 MG/DL — SIGNIFICANT CHANGE UP (ref 8.4–10.5)
CHLORIDE SERPL-SCNC: 102 MMOL/L — SIGNIFICANT CHANGE UP (ref 96–108)
CO2 SERPL-SCNC: 26 MMOL/L — SIGNIFICANT CHANGE UP (ref 22–31)
CREAT SERPL-MCNC: 0.54 MG/DL — SIGNIFICANT CHANGE UP (ref 0.5–1.3)
GLUCOSE BLDC GLUCOMTR-MCNC: 138 MG/DL — HIGH (ref 70–99)
GLUCOSE BLDC GLUCOMTR-MCNC: 150 MG/DL — HIGH (ref 70–99)
GLUCOSE BLDC GLUCOMTR-MCNC: 170 MG/DL — HIGH (ref 70–99)
GLUCOSE BLDC GLUCOMTR-MCNC: 197 MG/DL — HIGH (ref 70–99)
GLUCOSE SERPL-MCNC: 133 MG/DL — HIGH (ref 70–99)
HCT VFR BLD CALC: 24.6 % — LOW (ref 34.5–45)
HGB BLD-MCNC: 7.4 G/DL — LOW (ref 11.5–15.5)
MCHC RBC-ENTMCNC: 24.5 PG — LOW (ref 27–34)
MCHC RBC-ENTMCNC: 30.1 GM/DL — LOW (ref 32–36)
MCV RBC AUTO: 81.5 FL — SIGNIFICANT CHANGE UP (ref 80–100)
NRBC # BLD: 0 /100 WBCS — SIGNIFICANT CHANGE UP (ref 0–0)
PLATELET # BLD AUTO: 163 K/UL — SIGNIFICANT CHANGE UP (ref 150–400)
POTASSIUM SERPL-MCNC: 4 MMOL/L — SIGNIFICANT CHANGE UP (ref 3.5–5.3)
POTASSIUM SERPL-SCNC: 4 MMOL/L — SIGNIFICANT CHANGE UP (ref 3.5–5.3)
RBC # BLD: 3.02 M/UL — LOW (ref 3.8–5.2)
RBC # FLD: 16.4 % — HIGH (ref 10.3–14.5)
RH IG SCN BLD-IMP: NEGATIVE — SIGNIFICANT CHANGE UP
SODIUM SERPL-SCNC: 139 MMOL/L — SIGNIFICANT CHANGE UP (ref 135–145)
WBC # BLD: 9.66 K/UL — SIGNIFICANT CHANGE UP (ref 3.8–10.5)
WBC # FLD AUTO: 9.66 K/UL — SIGNIFICANT CHANGE UP (ref 3.8–10.5)

## 2021-02-26 RX ORDER — LABETALOL HCL 100 MG
100 TABLET ORAL ONCE
Refills: 0 | Status: COMPLETED | OUTPATIENT
Start: 2021-02-26 | End: 2021-02-26

## 2021-02-26 RX ORDER — POTASSIUM CHLORIDE 20 MEQ
20 PACKET (EA) ORAL DAILY
Refills: 0 | Status: DISCONTINUED | OUTPATIENT
Start: 2021-02-26 | End: 2021-02-28

## 2021-02-26 RX ORDER — LABETALOL HCL 100 MG
200 TABLET ORAL EVERY 8 HOURS
Refills: 0 | Status: DISCONTINUED | OUTPATIENT
Start: 2021-02-26 | End: 2021-02-28

## 2021-02-26 RX ORDER — FUROSEMIDE 40 MG
40 TABLET ORAL DAILY
Refills: 0 | Status: DISCONTINUED | OUTPATIENT
Start: 2021-02-26 | End: 2021-02-28

## 2021-02-26 RX ADMIN — Medication 20 MILLIEQUIVALENT(S): at 11:37

## 2021-02-26 RX ADMIN — ONDANSETRON 4 MILLIGRAM(S): 8 TABLET, FILM COATED ORAL at 15:12

## 2021-02-26 RX ADMIN — Medication 1: at 08:03

## 2021-02-26 RX ADMIN — INSULIN GLARGINE 23 UNIT(S): 100 INJECTION, SOLUTION SUBCUTANEOUS at 22:35

## 2021-02-26 RX ADMIN — Medication 40 MILLIGRAM(S): at 11:44

## 2021-02-26 RX ADMIN — HYDROMORPHONE HYDROCHLORIDE 4 MILLIGRAM(S): 2 INJECTION INTRAMUSCULAR; INTRAVENOUS; SUBCUTANEOUS at 10:19

## 2021-02-26 RX ADMIN — Medication 1 MILLIGRAM(S): at 11:37

## 2021-02-26 RX ADMIN — PANTOPRAZOLE SODIUM 40 MILLIGRAM(S): 20 TABLET, DELAYED RELEASE ORAL at 04:37

## 2021-02-26 RX ADMIN — ENOXAPARIN SODIUM 30 MILLIGRAM(S): 100 INJECTION SUBCUTANEOUS at 04:37

## 2021-02-26 RX ADMIN — HYDROMORPHONE HYDROCHLORIDE 4 MILLIGRAM(S): 2 INJECTION INTRAMUSCULAR; INTRAVENOUS; SUBCUTANEOUS at 15:12

## 2021-02-26 RX ADMIN — Medication 81 MILLIGRAM(S): at 11:37

## 2021-02-26 RX ADMIN — MUPIROCIN 1 APPLICATION(S): 20 OINTMENT TOPICAL at 04:38

## 2021-02-26 RX ADMIN — Medication 5 MILLIGRAM(S): at 22:12

## 2021-02-26 RX ADMIN — Medication 30 MILLILITER(S): at 15:12

## 2021-02-26 RX ADMIN — POLYETHYLENE GLYCOL 3350 17 GRAM(S): 17 POWDER, FOR SOLUTION ORAL at 11:37

## 2021-02-26 RX ADMIN — Medication 325 MILLIGRAM(S): at 17:44

## 2021-02-26 RX ADMIN — FAMOTIDINE 20 MILLIGRAM(S): 10 INJECTION INTRAVENOUS at 11:38

## 2021-02-26 RX ADMIN — Medication 12 UNIT(S): at 08:03

## 2021-02-26 RX ADMIN — ENOXAPARIN SODIUM 30 MILLIGRAM(S): 100 INJECTION SUBCUTANEOUS at 17:43

## 2021-02-26 RX ADMIN — Medication 12 UNIT(S): at 11:38

## 2021-02-26 RX ADMIN — CHLORHEXIDINE GLUCONATE 1 APPLICATION(S): 213 SOLUTION TOPICAL at 11:18

## 2021-02-26 RX ADMIN — Medication 325 MILLIGRAM(S): at 04:38

## 2021-02-26 RX ADMIN — Medication 200 MILLIGRAM(S): at 15:13

## 2021-02-26 RX ADMIN — Medication 200 MILLIGRAM(S): at 22:13

## 2021-02-26 RX ADMIN — Medication 5 MILLIGRAM(S): at 15:18

## 2021-02-26 RX ADMIN — MUPIROCIN 1 APPLICATION(S): 20 OINTMENT TOPICAL at 17:44

## 2021-02-26 RX ADMIN — Medication 100 MILLIGRAM(S): at 11:36

## 2021-02-26 RX ADMIN — HYDROMORPHONE HYDROCHLORIDE 4 MILLIGRAM(S): 2 INJECTION INTRAMUSCULAR; INTRAVENOUS; SUBCUTANEOUS at 22:13

## 2021-02-26 RX ADMIN — Medication 100 MILLIGRAM(S): at 04:37

## 2021-02-26 RX ADMIN — Medication 12 UNIT(S): at 17:43

## 2021-02-26 RX ADMIN — Medication 5 MILLIGRAM(S): at 04:37

## 2021-02-26 NOTE — PROGRESS NOTE ADULT - ASSESSMENT
Assessment  DMT2: 45y Female with DM T2 with hyperglycemia, A1C 6.3%, was on oral meds at home, postop on basal bolus insulin, increased dose yesterday, blood sugars improving and in overall acceptable range today, no hypoglycemic episodes, eating meals, appears comfortable.  Aortic Aneurysm: postop, on medications, no chest pain, stable, monitored.  Thyroid Nodules: seen on imaging, heterogeneous enlarged left thyroid lobe containing nodules as well as additional small nodules  in the right thyroid lobe. Patient reports she has had thyroid ultrasound as well as biopsy in the past, ?years ago, reportedly was benign. Euthyroid.  Obesity: No strict exercise routines, not on any weight loss program, neither on low calorie diet.      Marley Amado MD  Cell: [phone]  Office: [phone]       Assessment  DMT2: 45y Female with DM T2 with hyperglycemia, A1C 6.3%, was on oral meds at home, postop on basal bolus insulin, increased dose yesterday, blood sugars improving and in overall acceptable range today, no hypoglycemic episodes, eating meals, appears comfortable.  Aortic Aneurysm: postop, on medications, no chest pain, stable, monitored.  Thyroid Nodules: seen on imaging, heterogeneous enlarged left thyroid lobe containing nodules as well as additional small nodules  in the right thyroid lobe. Patient reports she  has had thyroid ultrasound as well as biopsy in the past, ?years ago, reportedly was benign. Euthyroid.  Obesity: No strict exercise routines, not on any weight loss program, neither on low calorie diet.      Marley Amado MD  Cell: [phone]  Office: [phone]

## 2021-02-26 NOTE — PROGRESS NOTE ADULT - PROBLEM SELECTOR PLAN 2
TFTs WNL. Patient will likely require thyroid US, this can be done as outpatient. Will continue monitoring and FU.

## 2021-02-26 NOTE — PROGRESS NOTE ADULT - SUBJECTIVE AND OBJECTIVE BOX
Chief complaint  Patient is a 45y old  Female who presents with a chief complaint of Type A dissection (26 Feb 2021 12:43)   Review of systems  Patient in bed, looks comfortable, no hypoglycemic episodes.    Labs and Fingersticks  CAPILLARY BLOOD GLUCOSE      POCT Blood Glucose.: 138 mg/dL (26 Feb 2021 11:18)  POCT Blood Glucose.: 170 mg/dL (26 Feb 2021 07:24)  POCT Blood Glucose.: 139 mg/dL (25 Feb 2021 21:43)  POCT Blood Glucose.: 133 mg/dL (25 Feb 2021 16:29)      Anion Gap, Serum: 11 (02-26 @ 04:58)  Anion Gap, Serum: 11 (02-25 @ 05:20)      Calcium, Total Serum: 8.6 (02-26 @ 04:58)  Calcium, Total Serum: 8.6 (02-25 @ 05:20)          02-26    139  |  102  |  14  ----------------------------<  133<H>  4.0   |  26  |  0.54    Ca    8.6      26 Feb 2021 04:58                          7.4    9.66  )-----------( 163      ( 26 Feb 2021 04:58 )             24.6     Medications  MEDICATIONS  (STANDING):  aspirin  chewable 81 milliGRAM(s) Oral daily  chlorhexidine 2% Cloths 1 Application(s) Topical <User Schedule>  dextrose 40% Gel 15 Gram(s) Oral once  dextrose 5%. 1000 milliLiter(s) (15 mL/Hr) IV Continuous <Continuous>  dextrose 50% Injectable 50 milliLiter(s) IV Push every 15 minutes  dextrose 50% Injectable 25 milliLiter(s) IV Push every 15 minutes  enoxaparin Injectable 30 milliGRAM(s) SubCutaneous every 12 hours  famotidine    Tablet 20 milliGRAM(s) Oral daily  ferrous    sulfate 325 milliGRAM(s) Oral two times a day  folic acid 1 milliGRAM(s) Oral daily  furosemide    Tablet 40 milliGRAM(s) Oral daily  glucagon  Injectable 1 milliGRAM(s) IntraMuscular once  influenza   Vaccine 0.5 milliLiter(s) IntraMuscular once  insulin glargine Injectable (LANTUS) 23 Unit(s) SubCutaneous at bedtime  insulin lispro (ADMELOG) corrective regimen sliding scale   SubCutaneous three times a day before meals  insulin lispro (ADMELOG) corrective regimen sliding scale   SubCutaneous at bedtime  insulin lispro Injectable (ADMELOG) 12 Unit(s) SubCutaneous three times a day before meals  labetalol 200 milliGRAM(s) Oral every 8 hours  metoclopramide 5 milliGRAM(s) Oral three times a day  mupirocin 2% Ointment 1 Application(s) Both Nostrils two times a day  pantoprazole    Tablet 40 milliGRAM(s) Oral before breakfast  polyethylene glycol 3350 17 Gram(s) Oral daily  potassium chloride    Tablet ER 20 milliEquivalent(s) Oral daily  sodium chloride 0.9%. 1000 milliLiter(s) (10 mL/Hr) IV Continuous <Continuous>  sorbitol 70% Solution 30 milliLiter(s) Oral daily      Physical Exam  General: Patient comfortable in bed  Vital Signs Last 12 Hrs  T(F): 97.9 (02-26-21 @ 12:18), Max: 98.6 (02-26-21 @ 04:30)  HR: 87 (02-26-21 @ 12:18) (85 - 87)  BP: 149/81 (02-26-21 @ 12:18) (144/67 - 149/81)  BP(mean): 93 (02-26-21 @ 04:30) (93 - 93)  RR: 18 (02-26-21 @ 12:18) (18 - 18)  SpO2: 92% (02-26-21 @ 12:18) (92% - 95%)  Neck: No palpable thyroid nodules.  CVS: S1S2, No murmurs  Respiratory: No wheezing, no crepitations  GI: Abdomen soft, bowel sounds positive  Musculoskeletal:  edema lower extremities.   Skin: No skin rashes, no ecchymosis    Diagnostics    Free Thyroxine, Serum: AM Sched. Collection: 25-Feb-2021 06:00 (02-24 @ 13:27)  Thyroid Stimulating Hormone, Serum: AM Sched. Collection: 25-Feb-2021 06:00 (02-24 @ 13:27)           Chief complaint  Patient is a 45y old  Female who presents with a chief complaint of Type A dissection (26 Feb 2021 12:43)   Review of systems  Patient in bed, looks comfortable, no hypoglycemic episodes.    Labs and Fingersticks  CAPILLARY BLOOD GLUCOSE      POCT Blood Glucose.: 138 mg/dL (26 Feb 2021 11:18)  POCT Blood Glucose.: 170 mg/dL (26 Feb 2021 07:24)  POCT Blood Glucose.: 139 mg/dL (25 Feb 2021 21:43)  POCT Blood Glucose.: 133 mg/dL (25 Feb 2021 16:29)      Anion Gap, Serum: 11 (02-26 @ 04:58)  Anion Gap, Serum: 11 (02-25 @ 05:20)      Calcium, Total Serum: 8.6 (02-26 @ 04:58)  Calcium, Total Serum: 8.6 (02-25 @ 05:20)          02-26    139  |  102  |  14  ----------------------------<  133<H>  4.0   |  26  |  0.54    Ca    8.6      26 Feb 2021 04:58                          7.4    9.66  )-----------( 163      ( 26 Feb 2021 04:58 )             24.6     Medications  MEDICATIONS  (STANDING):  aspirin  chewable 81 milliGRAM(s) Oral daily  chlorhexidine 2% Cloths 1 Application(s) Topical <User Schedule>  dextrose 40% Gel 15 Gram(s) Oral once  dextrose 5%. 1000 milliLiter(s) (15 mL/Hr) IV Continuous <Continuous>  dextrose 50% Injectable 50 milliLiter(s) IV Push every 15 minutes  dextrose 50% Injectable 25 milliLiter(s) IV Push every 15 minutes  enoxaparin Injectable 30 milliGRAM(s) SubCutaneous every 12 hours  famotidine    Tablet 20 milliGRAM(s) Oral daily  ferrous    sulfate 325 milliGRAM(s) Oral two times a day  folic acid 1 milliGRAM(s) Oral daily  furosemide    Tablet 40 milliGRAM(s) Oral daily  glucagon  Injectable 1 milliGRAM(s) IntraMuscular once  influenza   Vaccine 0.5 milliLiter(s) IntraMuscular once  insulin glargine Injectable (LANTUS) 23 Unit(s) SubCutaneous at bedtime  insulin lispro (ADMELOG) corrective regimen sliding scale   SubCutaneous three times a day before meals  insulin lispro (ADMELOG) corrective regimen sliding scale   SubCutaneous at bedtime  insulin lispro Injectable (ADMELOG) 12 Unit(s) SubCutaneous three times a day before meals  labetalol 200 milliGRAM(s) Oral every 8 hours  metoclopramide 5 milliGRAM(s) Oral three times a day  mupirocin 2% Ointment 1 Application(s) Both Nostrils two times a day  pantoprazole    Tablet 40 milliGRAM(s) Oral before breakfast  polyethylene glycol 3350 17 Gram(s) Oral daily  potassium chloride    Tablet ER 20 milliEquivalent(s) Oral daily  sodium chloride 0.9%. 1000 milliLiter(s) (10 mL/Hr) IV Continuous <Continuous>  sorbitol 70% Solution 30 milliLiter(s) Oral daily      Physical Exam  General: Patient comfortable in bed  Vital Signs Last 12 Hrs  T(F): 97.9 (02-26-21 @ 12:18), Max: 98.6 (02-26-21 @ 04:30)  HR: 87 (02-26-21 @ 12:18) (85 - 87)  BP: 149/81 (02-26-21 @ 12:18) (144/67 - 149/81)  BP(mean): 93 (02-26-21 @ 04:30) (93 - 93)  RR: 18 (02-26-21 @ 12:18) (18 - 18)  SpO2: 92% (02-26-21 @ 12:18) (92% - 95%)  Neck: No palpable thyroid nodules.  CVS: S1S2, No murmurs  Respiratory: No wheezing, no crepitations  GI: Abdomen soft, bowel sounds positive  Musculoskeletal:  edema lower extremities.   Skin: No skin rashes, no ecchymosis    Diagnostics    Free Thyroxine, Serum: AM Sched. Collection: 25-Feb-2021 06:00 (02-24 @ 13:27)  Thyroid Stimulating Hormone, Serum: AM Sched. Collection: 25-Feb-2021 06:00 (02-24 @ 13:27)

## 2021-02-26 NOTE — PROGRESS NOTE ADULT - PROBLEM SELECTOR PLAN 1
BP control  oral pain meds.  No toradol due to ibuprofen allergy  IV tylenol and percocet  Ambulate Continue with ASA 81 mg PO Daily.   Increase labetalol 200 mg PO TID for BP control.   Start on Lasix 40 mg PO daily with supplemental KCL 20 MEQ PO daily.   Increase activity as tolerated.   Pain management.  No Toradol due to ibuprofen allergy  Encourage Chest PT / Pulmonary toileting and Incentive spirometry every 1 hour x 10 while awake.  Continue with PUD and DVT prophylaxis.   Shower on POD #5.   Maintain PW isolated   D/C plan home PT once medically cleared   Plan of care discussed with attending

## 2021-02-26 NOTE — PROGRESS NOTE ADULT - ASSESSMENT
44 y/o woman, h/o lumbar meningocele, left iliac stent, DM (on Metformin and glimepiride), lymphedema, c/o chest pain radiating to jaw and abdomen and back, dizziness, nausea and vomiting today.  No SOB/fever/cough/syncope.  Pt had COVID-19 in March 2020 and says she tested positive for COVID-19 Ab after that.  pt underwent a ct scan angio which showed a Type A dissection . pt transferred for emergent surgery.   2/22/21  s/p Repair of Type A Aortic dissection with 28mm Hemashield [place] ascending aorta interposition graft. Resuspension of the aortic valve.  Aortic cross clamp time of 101 minutes.  Right axillary cutdown with 8mm graft to the axillary artery for arterial cannulation  Hypotension, elevated lactate, on norepinephrine, iv fluid resuscitation, multiple blood products  Correction in bp and decrease in lactate. PCA for pain control.  Ins gtt  2/23 transferred to sdu  2/24 HD stable.  Med 85cc/24.  PCA- dc'd  2/25 HD stable.  Mediastinal tube removed.  Transfer to floor.   2/26 VVS; 's --> Labetalol increased to 200 mg PO TID for better BP control.  Started on Lasix 40 mg PO daily and supplemental KCL 20 MEQ PO daily.    46 y/o woman, h/o lumbar meningocele, left iliac stent, DM (on Metformin and glimepiride), lymphedema, c/o chest pain radiating to jaw and abdomen and back, dizziness, nausea and vomiting today.  No SOB/fever/cough/syncope.  Pt had COVID-19 in March 2020 and says she tested positive for COVID-19 Ab after that.  pt underwent a ct scan angio which showed a Type A dissection . pt transferred for emergent surgery.   2/22/21  s/p Repair of Type A Aortic dissection with 28mm Hemashield [place] ascending aorta interposition graft. Resuspension of the aortic valve.  Aortic cross clamp time of 101 minutes.  Right axillary cutdown with 8mm graft to the axillary artery for arterial cannulation  Hypotension, elevated lactate, on norepinephrine, iv fluid resuscitation, multiple blood products  Correction in bp and decrease in lactate. PCA for pain control.  Ins gtt  2/23 transferred to sdu  2/24 HD stable.  Med 85cc/24.  PCA- dc'd  2/25 HD stable.  Mediastinal tube removed.  Transfer to floor.   2/26 VVS; 's --> Labetalol increased to 200 mg PO TID for better BP control.  Started on Lasix 40 mg PO daily and supplemental KCL 20 MEQ PO daily.   Disposition: Home PT once medically cleared

## 2021-02-26 NOTE — PROGRESS NOTE ADULT - SUBJECTIVE AND OBJECTIVE BOX
VITAL SIGNS    Subjective: "I'm feeling some SOB when I walk" Reports (+) BONILLA. Denies CP, palpitation, HA, dizziness, N/V/D, fever or chills.  No acute event noted overnight.     Telemetry: NSR 70-80     Vital Signs Last 24 Hrs  T(C): 36.6 (21 @ 12:18), Max: 37 (21 @ 04:30)  T(F): 97.9 (21 @ 12:18), Max: 98.6 (21 @ 04:30)  HR: 87 (21 @ 12:18) (76 - 87)  BP: 149/81 (21 @ 12:18) (128/78 - 149/81)  RR: 18 (21 @ 12:18) (18 - 18)  SpO2: 92% (21 @ 12:18) (89% - 97%)           07:01  -   @ 07:00  --------------------------------------------------------  IN: 1280 mL / OUT: 1720 mL / NET: -440 mL     @ 07:01  -   @ 12:43  --------------------------------------------------------  IN: 240 mL / OUT: 600 mL / NET: -360 mL    Daily     Daily Weight in k.2 (2021 08:08)    CAPILLARY BLOOD GLUCOSE    POCT Blood Glucose.: 138 mg/dL (2021 11:18)  POCT Blood Glucose.: 170 mg/dL (2021 07:24)  POCT Blood Glucose.: 139 mg/dL (2021 21:43)  POCT Blood Glucose.: 133 mg/dL (2021 16:29)        PHYSICAL EXAM    Neurology: alert and oriented x 3, nonfocal, no gross deficits    CV: (+) S1 and S2, No murmurs, rubs, gallops or clicks     Sternal Wound: MSI -->Opsite coverlet dressing in place -->CDI, sternum stable. PW --> Isolated. Right infraclavicular incision with opsite dressing C/D/I      Lungs: CTA B/L     Abdomen: soft, nontender, nondistended, positive bowel sounds, (+) Flatus; (-) BM     :  Voiding               Extremities:  B/L LE (+) 1 edema; negative calf tenderness; (+) 2 DP palpable        aspirin chewable 81 milliGRAM(s) Oral daily  chlorhexidine 2% Cloths 1 Application(s) Topical <User Schedule>  enoxaparin Injectable 30 milliGRAM(s) SubCutaneous every 12 hours  famotidine Tablet 20 milliGRAM(s) Oral daily  ferrous sulfate 325 milliGRAM(s) Oral two times a day  folic acid 1 milliGRAM(s) Oral daily  furosemide Tablet 40 milliGRAM(s) Oral daily  glucagon  Injectable 1 milliGRAM(s) IntraMuscular once  HYDROmorphone Tablet 2 milliGRAM(s) Oral every 4 hours PRN  HYDROmorphone Tablet 4 milliGRAM(s) Oral every 4 hours PRN  influenza Vaccine 0.5 milliLiter(s) IntraMuscular once  insulin glargine Injectable (LANTUS) 23 Unit(s) SubCutaneous at bedtime  insulin lispro (ADMELOG) corrective regimen sliding scale SubCutaneous three times a day before meals  insulin lispro (ADMELOG) corrective regimen sliding scale SubCutaneous at bedtime  insulin lispro Injectable (ADMELOG) 12 Unit(s) SubCutaneous three times a day before meals  labetalol 200 milliGRAM(s) Oral every 8 hours  metoclopramide 5 milliGRAM(s) Oral three times a day  mupirocin 2% Ointment 1 Application(s) Both Nostrils two times a day  ondansetron Injectable 4 milliGRAM(s) IV Push every 6 hours PRN  pantoprazole Tablet 40 milliGRAM(s) Oral before breakfast  polyethylene glycol 3350 17 Gram(s) Oral daily  potassium chloride Tablet ER 20 milliEquivalent(s) Oral daily  sodium chloride 0.9%. 1000 milliLiter(s) IV Continuous <Continuous>  sorbitol 70% Solution 30 milliLiter(s) Oral daily    Physical Therapy Rec:   Home  [  ]   Home w/ PT  [ X ]  Rehab  [  ]    Discussed with Cardiothoracic Team at AM rounds.

## 2021-02-26 NOTE — PROGRESS NOTE ADULT - PROBLEM SELECTOR PLAN 1
Will continue current insulin regimen for now. Will continue monitoring FS and FU.  Suggest to start insulin administration teachingCHRISTOPHER on current basal bolus insulin regimen with endo FU 2 weeks, discontinue previous PO meds.  Patient counseled for compliance with consistent low carb diet and exercise as tolerated outpatient.

## 2021-02-26 NOTE — PROGRESS NOTE ADULT - PROBLEM SELECTOR PLAN 2
insulin gtt dc'd  endocrine following  started on lantus 23 and admelog 12u   home meds were metformin and glimeperide.  HgA1c 6.3 Endocrine following  Continue on current insulin regimen of Lantus 23 units at HS and Pre meal Admelog 12 units SQ TID   Accucheck 4 times a day AC and HS cover with Admelog corrective scale.   Continue on Carb steady diet   Home meds were metformin and Glimepiride.  HgA1c 6.3

## 2021-02-27 LAB
ANION GAP SERPL CALC-SCNC: 10 MMOL/L — SIGNIFICANT CHANGE UP (ref 5–17)
BUN SERPL-MCNC: 14 MG/DL — SIGNIFICANT CHANGE UP (ref 7–23)
CALCIUM SERPL-MCNC: 8.8 MG/DL — SIGNIFICANT CHANGE UP (ref 8.4–10.5)
CHLORIDE SERPL-SCNC: 101 MMOL/L — SIGNIFICANT CHANGE UP (ref 96–108)
CO2 SERPL-SCNC: 25 MMOL/L — SIGNIFICANT CHANGE UP (ref 22–31)
CREAT SERPL-MCNC: 0.52 MG/DL — SIGNIFICANT CHANGE UP (ref 0.5–1.3)
GLUCOSE BLDC GLUCOMTR-MCNC: 117 MG/DL — HIGH (ref 70–99)
GLUCOSE BLDC GLUCOMTR-MCNC: 164 MG/DL — HIGH (ref 70–99)
GLUCOSE BLDC GLUCOMTR-MCNC: 167 MG/DL — HIGH (ref 70–99)
GLUCOSE BLDC GLUCOMTR-MCNC: 204 MG/DL — HIGH (ref 70–99)
GLUCOSE SERPL-MCNC: 116 MG/DL — HIGH (ref 70–99)
HCT VFR BLD CALC: 27.5 % — LOW (ref 34.5–45)
HGB BLD-MCNC: 8 G/DL — LOW (ref 11.5–15.5)
MCHC RBC-ENTMCNC: 24.4 PG — LOW (ref 27–34)
MCHC RBC-ENTMCNC: 29.1 GM/DL — LOW (ref 32–36)
MCV RBC AUTO: 83.8 FL — SIGNIFICANT CHANGE UP (ref 80–100)
NRBC # BLD: 0 /100 WBCS — SIGNIFICANT CHANGE UP (ref 0–0)
PLATELET # BLD AUTO: 137 K/UL — LOW (ref 150–400)
POTASSIUM SERPL-MCNC: 4.4 MMOL/L — SIGNIFICANT CHANGE UP (ref 3.5–5.3)
POTASSIUM SERPL-SCNC: 4.4 MMOL/L — SIGNIFICANT CHANGE UP (ref 3.5–5.3)
RBC # BLD: 3.28 M/UL — LOW (ref 3.8–5.2)
RBC # FLD: 16.6 % — HIGH (ref 10.3–14.5)
SODIUM SERPL-SCNC: 136 MMOL/L — SIGNIFICANT CHANGE UP (ref 135–145)
WBC # BLD: 8.87 K/UL — SIGNIFICANT CHANGE UP (ref 3.8–10.5)
WBC # FLD AUTO: 8.87 K/UL — SIGNIFICANT CHANGE UP (ref 3.8–10.5)

## 2021-02-27 RX ORDER — INSULIN LISPRO 100/ML
13 VIAL (ML) SUBCUTANEOUS
Refills: 0 | Status: DISCONTINUED | OUTPATIENT
Start: 2021-02-27 | End: 2021-02-28

## 2021-02-27 RX ORDER — INSULIN GLARGINE 100 [IU]/ML
25 INJECTION, SOLUTION SUBCUTANEOUS AT BEDTIME
Refills: 0 | Status: DISCONTINUED | OUTPATIENT
Start: 2021-02-27 | End: 2021-02-28

## 2021-02-27 RX ORDER — FUROSEMIDE 40 MG
20 TABLET ORAL ONCE
Refills: 0 | Status: COMPLETED | OUTPATIENT
Start: 2021-02-27 | End: 2021-02-27

## 2021-02-27 RX ADMIN — HYDROMORPHONE HYDROCHLORIDE 4 MILLIGRAM(S): 2 INJECTION INTRAMUSCULAR; INTRAVENOUS; SUBCUTANEOUS at 23:55

## 2021-02-27 RX ADMIN — Medication 1: at 08:49

## 2021-02-27 RX ADMIN — Medication 1 MILLIGRAM(S): at 11:58

## 2021-02-27 RX ADMIN — Medication 2: at 16:56

## 2021-02-27 RX ADMIN — Medication 40 MILLIGRAM(S): at 05:55

## 2021-02-27 RX ADMIN — CHLORHEXIDINE GLUCONATE 1 APPLICATION(S): 213 SOLUTION TOPICAL at 09:09

## 2021-02-27 RX ADMIN — Medication 20 MILLIEQUIVALENT(S): at 11:58

## 2021-02-27 RX ADMIN — ENOXAPARIN SODIUM 30 MILLIGRAM(S): 100 INJECTION SUBCUTANEOUS at 16:58

## 2021-02-27 RX ADMIN — PANTOPRAZOLE SODIUM 40 MILLIGRAM(S): 20 TABLET, DELAYED RELEASE ORAL at 05:56

## 2021-02-27 RX ADMIN — HYDROMORPHONE HYDROCHLORIDE 4 MILLIGRAM(S): 2 INJECTION INTRAMUSCULAR; INTRAVENOUS; SUBCUTANEOUS at 13:31

## 2021-02-27 RX ADMIN — MUPIROCIN 1 APPLICATION(S): 20 OINTMENT TOPICAL at 05:56

## 2021-02-27 RX ADMIN — POLYETHYLENE GLYCOL 3350 17 GRAM(S): 17 POWDER, FOR SOLUTION ORAL at 09:08

## 2021-02-27 RX ADMIN — Medication 200 MILLIGRAM(S): at 22:13

## 2021-02-27 RX ADMIN — Medication 325 MILLIGRAM(S): at 05:56

## 2021-02-27 RX ADMIN — Medication 200 MILLIGRAM(S): at 13:31

## 2021-02-27 RX ADMIN — Medication 81 MILLIGRAM(S): at 12:02

## 2021-02-27 RX ADMIN — FAMOTIDINE 20 MILLIGRAM(S): 10 INJECTION INTRAVENOUS at 11:58

## 2021-02-27 RX ADMIN — Medication 20 MILLIGRAM(S): at 11:58

## 2021-02-27 RX ADMIN — Medication 5 MILLIGRAM(S): at 05:56

## 2021-02-27 RX ADMIN — Medication 13 UNIT(S): at 13:26

## 2021-02-27 RX ADMIN — Medication 325 MILLIGRAM(S): at 16:58

## 2021-02-27 RX ADMIN — Medication 30 MILLILITER(S): at 11:58

## 2021-02-27 RX ADMIN — Medication 12 UNIT(S): at 08:50

## 2021-02-27 RX ADMIN — Medication 13 UNIT(S): at 16:56

## 2021-02-27 RX ADMIN — Medication 5 MILLIGRAM(S): at 13:31

## 2021-02-27 RX ADMIN — ENOXAPARIN SODIUM 30 MILLIGRAM(S): 100 INJECTION SUBCUTANEOUS at 05:56

## 2021-02-27 RX ADMIN — HYDROMORPHONE HYDROCHLORIDE 4 MILLIGRAM(S): 2 INJECTION INTRAMUSCULAR; INTRAVENOUS; SUBCUTANEOUS at 09:06

## 2021-02-27 RX ADMIN — HYDROMORPHONE HYDROCHLORIDE 4 MILLIGRAM(S): 2 INJECTION INTRAMUSCULAR; INTRAVENOUS; SUBCUTANEOUS at 02:52

## 2021-02-27 RX ADMIN — INSULIN GLARGINE 25 UNIT(S): 100 INJECTION, SOLUTION SUBCUTANEOUS at 22:14

## 2021-02-27 RX ADMIN — HYDROMORPHONE HYDROCHLORIDE 4 MILLIGRAM(S): 2 INJECTION INTRAMUSCULAR; INTRAVENOUS; SUBCUTANEOUS at 19:57

## 2021-02-27 RX ADMIN — Medication 5 MILLIGRAM(S): at 22:13

## 2021-02-27 RX ADMIN — Medication 200 MILLIGRAM(S): at 05:56

## 2021-02-27 NOTE — PROGRESS NOTE ADULT - SUBJECTIVE AND OBJECTIVE BOX
subjective   VITAL SIGNS    Telemetry:      Vital Signs Last 24 Hrs  T(C): 36.7 (21 @ 05:38), Max: 36.8 (21 @ 19:40)  T(F): 98.1 (21 @ 05:38), Max: 98.2 (21 @ 19:40)  HR: 85 (21 @ 05:38) (85 - 91)  BP: 127/79 (21 @ 05:38) (127/79 - 149/81)  RR: 18 (21 @ 05:38) (18 - 18)  SpO2: 92% (21 @ 05:38) (92% - 94%)                    @ 07:01  -   @ 07:00  --------------------------------------------------------  IN: 1040 mL / OUT: 1100 mL / NET: -60 mL    Daily Weight in k.2 (2021 08:08)  MEDICATIONS  (STANDING):  aspirin  chewable 81 milliGRAM(s) Oral daily  chlorhexidine 2% Cloths 1 Application(s) Topical <User Schedule>s  enoxaparin Injectable 30 milliGRAM(s) SubCutaneous every 12 hours  famotidine    Tablet 20 milliGRAM(s) Oral daily  ferrous    sulfate 325 milliGRAM(s) Oral two times a day  folic acid 1 milliGRAM(s) Oral daily  furosemide    Tablet 40 milliGRAM(s) Oral daily  glucagon  Injectable 1 milliGRAM(s) IntraMuscular once  influenza   Vaccine 0.5 milliLiter(s) IntraMuscular once  insulin glargine Injectable (LANTUS) 23 Unit(s) SubCutaneous at bedtime  insulin lispro (ADMELOG) corrective regimen sliding scale   SubCutaneous three times a day before meals  insulin lispro (ADMELOG) corrective regimen sliding scale   SubCutaneous at bedtime  insulin lispro Injectable (ADMELOG) 12 Unit(s) SubCutaneous three times a day before meals  labetalol 200 milliGRAM(s) Oral every 8 hours  metoclopramide 5 milliGRAM(s) Oral three times a day  pantoprazole    Tablet 40 milliGRAM(s) Oral before breakfast  polyethylene glycol 3350 17 Gram(s) Oral daily  potassium chloride    Tablet ER 20 milliEquivalent(s) Oral daily  sodium chloride 0.9%. 1000 milliLiter(s) (10 mL/Hr) IV Continuous <Continuous>  sorbitol 70% Solution 30 milliLiter(s) Oral daily    MEDICATIONS  (PRN):  HYDROmorphone   Tablet 2 milliGRAM(s) Oral every 4 hours PRN Moderate Pain (4 - 6)  HYDROmorphone   Tablet 4 milliGRAM(s) Oral every 4 hours PRN Severe Pain (7 - 10)  ondansetron Injectable 4 milliGRAM(s) IV Push every 6 hours PRN Nausea        CAPILLARY BLOOD GLUCOSE      POCT Blood Glucose.: 197 mg/dL (2021 22:23)  POCT Blood Glucose.: 150 mg/dL (2021 16:50)  POCT Blood Glucose.: 138 mg/dL (2021 11:18)                                  PHYSICAL EXAM        Neurology: alert and oriented x 3, nonfocal, no gross deficits    CV :    Sternal Wound :  CDI , Stable    Lungs:    Abdomen: soft, nontender, nondistended, positive bowel sounds, last bowel movement     :               Extremities:                                           Physical Therapy Rec:   Home  [  ]   Home w/ PT  [  ]  Rehab  [  ]    Discussed with Cardiothoracic Team at AM rounds.   subjective   VITAL SIGNS    Telemetry:      Vital Signs Last 24 Hrs  T(C): 36.7 (21 @ 05:38), Max: 36.8 (21 @ 19:40)  T(F): 98.1 (21 @ 05:38), Max: 98.2 (21 @ 19:40)  HR: 85 (21 @ 05:38) (85 - 91)  BP: 127/79 (21 @ 05:38) (127/79 - 149/81)  RR: 18 (21 @ 05:38) (18 - 18)  SpO2: 92% (21 @ 05:38) (92% - 94%)            @ 07:01  -   @ 07:00  --------------------------------------------------------  IN: 1040 mL / OUT: 1100 mL / NET: -60 mL      Daily Weight in k.6 (2021 07:28)    MEDICATIONS  (STANDING):  aspirin  chewable 81 milliGRAM(s) Oral daily  chlorhexidine 2% Cloths 1 Application(s) Topical <User Schedule>s  enoxaparin Injectable 30 milliGRAM(s) SubCutaneous every 12 hours  famotidine    Tablet 20 milliGRAM(s) Oral daily  ferrous    sulfate 325 milliGRAM(s) Oral two times a day  folic acid 1 milliGRAM(s) Oral daily  furosemide    Tablet 40 milliGRAM(s) Oral daily  glucagon  Injectable 1 milliGRAM(s) IntraMuscular once  influenza   Vaccine 0.5 milliLiter(s) IntraMuscular once  insulin glargine Injectable (LANTUS) 23 Unit(s) SubCutaneous at bedtime  insulin lispro (ADMELOG) corrective regimen sliding scale   SubCutaneous three times a day before meals  insulin lispro (ADMELOG) corrective regimen sliding scale   SubCutaneous at bedtime  insulin lispro Injectable (ADMELOG) 12 Unit(s) SubCutaneous three times a day before meals  labetalol 200 milliGRAM(s) Oral every 8 hours  metoclopramide 5 milliGRAM(s) Oral three times a day  pantoprazole    Tablet 40 milliGRAM(s) Oral before breakfast  polyethylene glycol 3350 17 Gram(s) Oral daily  potassium chloride    Tablet ER 20 milliEquivalent(s) Oral daily  sodium chloride 0.9%. 1000 milliLiter(s) (10 mL/Hr) IV Continuous <Continuous>  sorbitol 70% Solution 30 milliLiter(s) Oral daily    MEDICATIONS  (PRN):  HYDROmorphone   Tablet 2 milliGRAM(s) Oral every 4 hours PRN Moderate Pain (4 - 6)  HYDROmorphone   Tablet 4 milliGRAM(s) Oral every 4 hours PRN Severe Pain (7 - 10)  ondansetron Injectable 4 milliGRAM(s) IV Push every 6 hours PRN Nausea        CAPILLARY BLOOD GLUCOSE      POCT Blood Glucose.: 197 mg/dL (2021 22:23)  POCT Blood Glucose.: 150 mg/dL (2021 16:50)  POCT Blood Glucose.: 138 mg/dL (2021 11:18)        PHYSICAL EXAM  Neurology: alert and oriented x 3, nonfocal, no gross deficits    CV : Si S2 RRR      Sternal Wound :   MER sternum stable- RT SCL MER site benign  + PW    Lungs: B/l CTA on room air    Abdomen: soft, nontender, nondistended, positive bowel sounds, last bowel movement preop + Flatus     : voids              Extremities:  equal strength throughout   B/lle warm well perfused + Dp +1 edema                                          Physical Therapy Rec:   Home  [ x ]   Home w/ PT  [  ]  Rehab  [  ]    Discussed with Cardiothoracic Team at AM rounds.

## 2021-02-27 NOTE — PROGRESS NOTE ADULT - ASSESSMENT
Assessment  DMT2: 45y Female with DM T2 with hyperglycemia, A1C 6.3%, was on oral meds at home, postop on basal bolus insulin, blood sugars improving no hypoglycemic episodes, eating meals, appears comfortable.  Aortic Aneurysm: postop, on medications, no chest pain, stable, monitored.  Thyroid Nodules: seen on imaging, heterogeneous enlarged left thyroid lobe containing nodules as well as additional small nodules  in the right thyroid lobe. Patient reports she  has had thyroid ultrasound as well as biopsy in the past, ?years ago, reportedly was benign. Euthyroid.  Obesity: No strict exercise routines, not on any weight loss program, neither on low calorie diet.      Marley Amado MD  Cell: [phone]  Office: [phone]

## 2021-02-27 NOTE — PROGRESS NOTE ADULT - PROBLEM SELECTOR PLAN 2
Endocrine following  Continue on current insulin regimen of Lantus 23 units at HS and Pre meal Admelog 12 units SQ TID   Accucheck 4 times a day AC and HS cover with Admelog corrective scale.   Continue on Carb steady diet   Home meds were metformin and Glimepiride.  HgA1c 6.3

## 2021-02-27 NOTE — PROGRESS NOTE ADULT - SUBJECTIVE AND OBJECTIVE BOX
Chief complaint  Patient is a 45y old  Female who presents with a chief complaint of Type A dissection (27 Feb 2021 07:51)   Review of systems  Patient in bed, appears comfortable.    Labs and Fingersticks  CAPILLARY BLOOD GLUCOSE    POCT Blood Glucose.: 204 mg/dL (27 Feb 2021 16:33)  POCT Blood Glucose.: 117 mg/dL (27 Feb 2021 12:24)  POCT Blood Glucose.: 164 mg/dL (27 Feb 2021 08:09)  POCT Blood Glucose.: 197 mg/dL (26 Feb 2021 22:23)      Anion Gap, Serum: 10 (02-27 @ 05:05)  Anion Gap, Serum: 11 (02-26 @ 04:58)      Calcium, Total Serum: 8.8 (02-27 @ 05:05)  Calcium, Total Serum: 8.6 (02-26 @ 04:58)          02-27    136  |  101  |  14  ----------------------------<  116<H>  4.4   |  25  |  0.52    Ca    8.8      27 Feb 2021 05:05                          8.0    8.87  )-----------( 137      ( 27 Feb 2021 05:05 )             27.5     Medications  MEDICATIONS  (STANDING):  aspirin  chewable 81 milliGRAM(s) Oral daily  chlorhexidine 2% Cloths 1 Application(s) Topical <User Schedule>  dextrose 40% Gel 15 Gram(s) Oral once  dextrose 5%. 1000 milliLiter(s) (15 mL/Hr) IV Continuous <Continuous>  dextrose 50% Injectable 50 milliLiter(s) IV Push every 15 minutes  dextrose 50% Injectable 25 milliLiter(s) IV Push every 15 minutes  enoxaparin Injectable 30 milliGRAM(s) SubCutaneous every 12 hours  famotidine    Tablet 20 milliGRAM(s) Oral daily  ferrous    sulfate 325 milliGRAM(s) Oral two times a day  folic acid 1 milliGRAM(s) Oral daily  furosemide    Tablet 40 milliGRAM(s) Oral daily  glucagon  Injectable 1 milliGRAM(s) IntraMuscular once  influenza   Vaccine 0.5 milliLiter(s) IntraMuscular once  insulin glargine Injectable (LANTUS) 25 Unit(s) SubCutaneous at bedtime  insulin lispro (ADMELOG) corrective regimen sliding scale   SubCutaneous three times a day before meals  insulin lispro (ADMELOG) corrective regimen sliding scale   SubCutaneous at bedtime  insulin lispro Injectable (ADMELOG) 13 Unit(s) SubCutaneous three times a day before meals  labetalol 200 milliGRAM(s) Oral every 8 hours  metoclopramide 5 milliGRAM(s) Oral three times a day  pantoprazole    Tablet 40 milliGRAM(s) Oral before breakfast  polyethylene glycol 3350 17 Gram(s) Oral daily  potassium chloride    Tablet ER 20 milliEquivalent(s) Oral daily  sodium chloride 0.9%. 1000 milliLiter(s) (10 mL/Hr) IV Continuous <Continuous>  sorbitol 70% Solution 30 milliLiter(s) Oral daily      Physical Exam  General: Patient comfortable in bed  Vital Signs Last 12 Hrs  T(F): 97.5 (02-27-21 @ 13:09), Max: 97.5 (02-27-21 @ 13:09)  HR: 86 (02-27-21 @ 13:09) (86 - 86)  BP: 127/77 (02-27-21 @ 13:09) (127/77 - 127/77)  BP(mean): 94 (02-27-21 @ 13:09) (94 - 94)  RR: 18 (02-27-21 @ 13:09) (18 - 18)  SpO2: 93% (02-27-21 @ 13:09) (93% - 93%)  Neck: No palpable thyroid nodules.  CVS: S1S2, No murmurs  Respiratory: No wheezing, no crepitations  GI: Abdomen soft, bowel sounds positive  Musculoskeletal:  edema lower extremities.     Diagnostics

## 2021-02-27 NOTE — PROGRESS NOTE ADULT - PROBLEM SELECTOR PLAN 1
Continue with ASA 81 mg PO Daily.   Increase labetalol 200 mg PO TID for BP control.   Start on Lasix 40 mg PO daily with supplemental KCL 20 MEQ PO daily.   Increase activity as tolerated.   Pain management.  No Toradol due to ibuprofen allergy  Encourage Chest PT / Pulmonary toileting and Incentive spirometry every 1 hour x 10 while awake.  Continue with PUD and DVT prophylaxis.   Shower on POD #5.   Maintain PW isolated   D/C plan home PT once medically cleared   Plan of care discussed with attending Continue with ASA 81 mg PO Daily.   Increase labetalol 200 mg PO TID for BP control.   Start on Lasix 40 mg PO daily with supplemental KCL 20 MEQ PO daily.   Increase activity as tolerated.   Pain management.  No Toradol due to ibuprofen allergy  Encourage Chest PT / Pulmonary toileting and Incentive spirometry every 1 hour x 10 while awake.  Continue with PUD and DVT prophylaxis.   Shower  this  am    pacing wires removed  D/C plan home PT once medically cleared   Plan of care discussed with attending

## 2021-02-27 NOTE — PROGRESS NOTE ADULT - ASSESSMENT
44 y/o woman, h/o lumbar meningocele, left iliac stent, DM (on Metformin and glimepiride), lymphedema, c/o chest pain radiating to jaw and abdomen and back, dizziness, nausea and vomiting today.  No SOB/fever/cough/syncope.  Pt had COVID-19 in March 2020 and says she tested positive for COVID-19 Ab after that.  pt underwent a ct scan angio which showed a Type A dissection . pt transferred for emergent surgery.   2/22/21  s/p Repair of Type A Aortic dissection with 28mm [employer] [place] ascending aorta interposition graft. Resuspension of the aortic valve.  Aortic cross clamp time of 101 minutes.  Right axillary cutdown with 8mm graft to the axillary artery for arterial cannulation  Hypotension, elevated lactate, on norepinephrine, iv fluid resuscitation, multiple blood products  Correction in bp and decrease in lactate. PCA for pain control.  Ins gtt  2/23 transferred to sdu  2/24 HD stable.  Med 85cc/24.  PCA- dc'd  2/25 HD stable.  Mediastinal tube removed.  Transfer to floor.   2/26 VVS; 's --> Labetalol increased to 200 mg PO TID for better BP control.  Started on Lasix 40 mg PO daily and supplemental KCL 20 MEQ PO daily.   Disposition: Home PT once medically cleared  46 y/o woman, h/o lumbar meningocele, left iliac stent, DM (on Metformin and glimepiride), lymphedema, c/o chest pain radiating to jaw and abdomen and back, dizziness, nausea and vomiting today.  No SOB/fever/cough/syncope.  Pt had COVID-19 in March 2020 and says she tested positive for COVID-19 Ab after that.  pt underwent a ct scan angio which showed a Type A dissection . pt transferred for emergent surgery.   2/22/21  s/p Repair of Type A Aortic dissection with 28mm Hem[employer] ascending aorta interposition graft. Resuspension of the aortic valve.  Aortic cross clamp time of 101 minutes.  Right axillary cutdown with 8mm graft to the axillary artery for arterial cannulation  Hypotension, elevated lactate, on norepinephrine, iv fluid resuscitation, multiple blood products  Correction in bp and decrease in lactate. PCA for pain control.  Ins gtt  2/23 transferred to sdu  2/24 HD stable.  Med 85cc/24.  PCA- dc'd  2/25 HD stable.  Mediastinal tube removed.  Transfer to floor.   2/26 VVS; 's --> Labetalol increased to 200 mg PO TID for better BP control.  Started on Lasix 40 mg PO daily and supplemental KCL 20 MEQ PO daily.   2/27 VSS weight up 1.5  kg Lasix  20mg IV  x 1  c/w   diuretic regimen Sorbitol for  BM  Pacing wires removed dressings remob=babak shower today discharge to home in am likely with insulin  Disposition: Home PT in am

## 2021-02-28 ENCOUNTER — TRANSCRIPTION ENCOUNTER (OUTPATIENT)
Age: 46
End: 2021-02-28

## 2021-02-28 VITALS
SYSTOLIC BLOOD PRESSURE: 134 MMHG | DIASTOLIC BLOOD PRESSURE: 73 MMHG | RESPIRATION RATE: 18 BRPM | TEMPERATURE: 98 F | HEART RATE: 84 BPM | OXYGEN SATURATION: 93 %

## 2021-02-28 LAB
ANION GAP SERPL CALC-SCNC: 10 MMOL/L — SIGNIFICANT CHANGE UP (ref 5–17)
BUN SERPL-MCNC: 14 MG/DL — SIGNIFICANT CHANGE UP (ref 7–23)
CALCIUM SERPL-MCNC: 8.8 MG/DL — SIGNIFICANT CHANGE UP (ref 8.4–10.5)
CHLORIDE SERPL-SCNC: 101 MMOL/L — SIGNIFICANT CHANGE UP (ref 96–108)
CO2 SERPL-SCNC: 27 MMOL/L — SIGNIFICANT CHANGE UP (ref 22–31)
CREAT SERPL-MCNC: 0.55 MG/DL — SIGNIFICANT CHANGE UP (ref 0.5–1.3)
GLUCOSE BLDC GLUCOMTR-MCNC: 101 MG/DL — HIGH (ref 70–99)
GLUCOSE BLDC GLUCOMTR-MCNC: 133 MG/DL — HIGH (ref 70–99)
GLUCOSE BLDC GLUCOMTR-MCNC: 154 MG/DL — HIGH (ref 70–99)
GLUCOSE SERPL-MCNC: 119 MG/DL — HIGH (ref 70–99)
HCT VFR BLD CALC: 26.4 % — LOW (ref 34.5–45)
HGB BLD-MCNC: 8 G/DL — LOW (ref 11.5–15.5)
MCHC RBC-ENTMCNC: 24.5 PG — LOW (ref 27–34)
MCHC RBC-ENTMCNC: 30.3 GM/DL — LOW (ref 32–36)
MCV RBC AUTO: 81 FL — SIGNIFICANT CHANGE UP (ref 80–100)
NRBC # BLD: 0 /100 WBCS — SIGNIFICANT CHANGE UP (ref 0–0)
PLATELET # BLD AUTO: 237 K/UL — SIGNIFICANT CHANGE UP (ref 150–400)
POTASSIUM SERPL-MCNC: 4.3 MMOL/L — SIGNIFICANT CHANGE UP (ref 3.5–5.3)
POTASSIUM SERPL-SCNC: 4.3 MMOL/L — SIGNIFICANT CHANGE UP (ref 3.5–5.3)
RBC # BLD: 3.26 M/UL — LOW (ref 3.8–5.2)
RBC # FLD: 16.4 % — HIGH (ref 10.3–14.5)
SODIUM SERPL-SCNC: 138 MMOL/L — SIGNIFICANT CHANGE UP (ref 135–145)
WBC # BLD: 10.21 K/UL — SIGNIFICANT CHANGE UP (ref 3.8–10.5)
WBC # FLD AUTO: 10.21 K/UL — SIGNIFICANT CHANGE UP (ref 3.8–10.5)

## 2021-02-28 PROCEDURE — 71045 X-RAY EXAM CHEST 1 VIEW: CPT | Mod: 26

## 2021-02-28 RX ORDER — FOLIC ACID 0.8 MG
1 TABLET ORAL
Qty: 30 | Refills: 0
Start: 2021-02-28 | End: 2021-03-29

## 2021-02-28 RX ORDER — FERROUS SULFATE 325(65) MG
1 TABLET ORAL
Qty: 60 | Refills: 0
Start: 2021-02-28 | End: 2021-03-29

## 2021-02-28 RX ORDER — METFORMIN HYDROCHLORIDE 850 MG/1
0 TABLET ORAL
Qty: 0 | Refills: 0 | DISCHARGE

## 2021-02-28 RX ORDER — POTASSIUM CHLORIDE 20 MEQ
1 PACKET (EA) ORAL
Qty: 0 | Refills: 0 | DISCHARGE
Start: 2021-02-28

## 2021-02-28 RX ORDER — LABETALOL HCL 100 MG
1 TABLET ORAL
Qty: 90 | Refills: 1
Start: 2021-02-28 | End: 2021-04-28

## 2021-02-28 RX ORDER — INSULIN GLARGINE 100 [IU]/ML
25 INJECTION, SOLUTION SUBCUTANEOUS
Qty: 750 | Refills: 1
Start: 2021-02-28 | End: 2021-04-28

## 2021-02-28 RX ORDER — POTASSIUM CHLORIDE 20 MEQ
1 PACKET (EA) ORAL
Qty: 30 | Refills: 0
Start: 2021-02-28 | End: 2021-03-29

## 2021-02-28 RX ORDER — INSULIN LISPRO 100/ML
13 VIAL (ML) SUBCUTANEOUS
Qty: 1 | Refills: 1
Start: 2021-02-28 | End: 2021-04-28

## 2021-02-28 RX ORDER — HYDROMORPHONE HYDROCHLORIDE 2 MG/ML
1 INJECTION INTRAMUSCULAR; INTRAVENOUS; SUBCUTANEOUS
Qty: 20 | Refills: 0
Start: 2021-02-28 | End: 2021-03-04

## 2021-02-28 RX ORDER — FUROSEMIDE 40 MG
1 TABLET ORAL
Qty: 30 | Refills: 0
Start: 2021-02-28 | End: 2021-03-29

## 2021-02-28 RX ORDER — GLIMEPIRIDE 1 MG
0 TABLET ORAL
Qty: 0 | Refills: 0 | DISCHARGE

## 2021-02-28 RX ORDER — INSULIN DETEMIR 100/ML (3)
25 INSULIN PEN (ML) SUBCUTANEOUS
Qty: 1 | Refills: 0
Start: 2021-02-28 | End: 2021-03-29

## 2021-02-28 RX ADMIN — Medication 5 MILLIGRAM(S): at 05:51

## 2021-02-28 RX ADMIN — Medication 200 MILLIGRAM(S): at 12:43

## 2021-02-28 RX ADMIN — FAMOTIDINE 20 MILLIGRAM(S): 10 INJECTION INTRAVENOUS at 12:28

## 2021-02-28 RX ADMIN — Medication 13 UNIT(S): at 07:58

## 2021-02-28 RX ADMIN — Medication 325 MILLIGRAM(S): at 05:51

## 2021-02-28 RX ADMIN — Medication 40 MILLIGRAM(S): at 05:51

## 2021-02-28 RX ADMIN — PANTOPRAZOLE SODIUM 40 MILLIGRAM(S): 20 TABLET, DELAYED RELEASE ORAL at 05:51

## 2021-02-28 RX ADMIN — Medication 200 MILLIGRAM(S): at 05:51

## 2021-02-28 RX ADMIN — Medication 1 MILLIGRAM(S): at 12:28

## 2021-02-28 RX ADMIN — Medication 20 MILLIEQUIVALENT(S): at 12:28

## 2021-02-28 RX ADMIN — Medication 1: at 07:58

## 2021-02-28 RX ADMIN — Medication 13 UNIT(S): at 12:27

## 2021-02-28 RX ADMIN — ENOXAPARIN SODIUM 30 MILLIGRAM(S): 100 INJECTION SUBCUTANEOUS at 05:52

## 2021-02-28 RX ADMIN — Medication 81 MILLIGRAM(S): at 12:28

## 2021-02-28 NOTE — PROGRESS NOTE ADULT - SUBJECTIVE AND OBJECTIVE BOX
Chief complaint  Patient is a 45y old  Female who presents with a chief complaint of Type A dissection (28 Feb 2021 12:02)   Review of systems  Patient in bed, looks comfortable, no hypoglycemic episodes. Planning DC home today.    Labs and Fingersticks  CAPILLARY BLOOD GLUCOSE      POCT Blood Glucose.: 133 mg/dL (28 Feb 2021 12:32)  POCT Blood Glucose.: 101 mg/dL (28 Feb 2021 11:49)  POCT Blood Glucose.: 154 mg/dL (28 Feb 2021 07:48)  POCT Blood Glucose.: 167 mg/dL (27 Feb 2021 21:58)  POCT Blood Glucose.: 204 mg/dL (27 Feb 2021 16:33)    Medications  MEDICATIONS  (STANDING):  aspirin  chewable 81 milliGRAM(s) Oral daily  chlorhexidine 2% Cloths 1 Application(s) Topical <User Schedule>  dextrose 40% Gel 15 Gram(s) Oral once  dextrose 5%. 1000 milliLiter(s) (15 mL/Hr) IV Continuous <Continuous>  dextrose 50% Injectable 50 milliLiter(s) IV Push every 15 minutes  dextrose 50% Injectable 25 milliLiter(s) IV Push every 15 minutes  enoxaparin Injectable 30 milliGRAM(s) SubCutaneous every 12 hours  famotidine    Tablet 20 milliGRAM(s) Oral daily  ferrous    sulfate 325 milliGRAM(s) Oral two times a day  folic acid 1 milliGRAM(s) Oral daily  furosemide    Tablet 40 milliGRAM(s) Oral daily  glucagon  Injectable 1 milliGRAM(s) IntraMuscular once  influenza   Vaccine 0.5 milliLiter(s) IntraMuscular once  insulin glargine Injectable (LANTUS) 25 Unit(s) SubCutaneous at bedtime  insulin lispro (ADMELOG) corrective regimen sliding scale   SubCutaneous three times a day before meals  insulin lispro (ADMELOG) corrective regimen sliding scale   SubCutaneous at bedtime  insulin lispro Injectable (ADMELOG) 13 Unit(s) SubCutaneous three times a day before meals  labetalol 200 milliGRAM(s) Oral every 8 hours  metoclopramide 5 milliGRAM(s) Oral three times a day  pantoprazole    Tablet 40 milliGRAM(s) Oral before breakfast  polyethylene glycol 3350 17 Gram(s) Oral daily  potassium chloride    Tablet ER 20 milliEquivalent(s) Oral daily  sodium chloride 0.9%. 1000 milliLiter(s) (10 mL/Hr) IV Continuous <Continuous>  sorbitol 70% Solution 30 milliLiter(s) Oral daily      Physical Exam  General: Patient comfortable in bed  Vital Signs Last 12 Hrs  T(F): 97.7 (02-28-21 @ 12:41), Max: 98.6 (02-28-21 @ 04:51)  HR: 84 (02-28-21 @ 12:41) (79 - 84)  BP: 134/73 (02-28-21 @ 12:41) (123/74 - 134/73)  BP(mean): 94 (02-28-21 @ 12:41) (90 - 94)  RR: 18 (02-28-21 @ 12:41) (18 - 18)  SpO2: 93% (02-28-21 @ 12:41) (91% - 93%)      Diagnostics    Free Thyroxine, Serum: AM Sched. Collection: 25-Feb-2021 06:00 (02-24 @ 13:27)  Thyroid Stimulating Hormone, Serum: AM Sched. Collection: 25-Feb-2021 06:00 (02-24 @ 13:27)

## 2021-02-28 NOTE — PROGRESS NOTE ADULT - PROBLEM SELECTOR PLAN 1
Continue with ASA 81 mg PO Daily.   Increase labetalol 200 mg PO TID for BP control.   Start on Lasix 40 mg PO daily with supplemental KCL 20 MEQ PO daily.   Increase activity as tolerated.   Pain management.  No Toradol due to ibuprofen allergy  Encourage Chest PT / Pulmonary toileting and Incentive spirometry every 1 hour x 10 while awake.  Continue with PUD and DVT prophylaxis.   Shower  this  am    pacing wires removed  D/C plan home PT once medically cleared   Plan of care discussed with attending

## 2021-02-28 NOTE — PROGRESS NOTE ADULT - REASON FOR ADMISSION
Type A dissection

## 2021-02-28 NOTE — PROGRESS NOTE ADULT - ASSESSMENT
Assessment  DMT2: 45y Female with DM T2 with hyperglycemia, A1C 6.3%, was on oral meds at home, postop on basal bolus insulin, blood sugars improved and trending within acceptable range, no hypoglycemic episodes. Patient is eating meals, appears comfortable, planning DC home today, insulin administration teaching completed.  Aortic Aneurysm: postop, on medications, no chest pain, stable, monitored.  Thyroid Nodules: seen on imaging, heterogeneous enlarged left thyroid lobe containing nodules as well as additional small nodules  in the right thyroid lobe. Patient reports she  has had thyroid ultrasound as well as biopsy in the past, ?years ago, reportedly was benign. Euthyroid.  Obesity: No strict exercise routines, not on any weight loss program, neither on low calorie diet.      Marley Amado MD  Cell: [phone]  Office: [phone]

## 2021-02-28 NOTE — PROGRESS NOTE ADULT - PROBLEM SELECTOR PLAN 1
Will continue current insulin regimen for now. Will continue monitoring FS and FU.  Patient with moderate insulin requirement, agreeable to insulin, teaching now completed. Suggest DC on current basal bolus insulin regimen with endo FU 2 weeks, discontinue previous PO meds.  Patient counseled for compliance with consistent low carb diet and exercise as tolerated outpatient.

## 2021-02-28 NOTE — DISCHARGE NOTE PROVIDER - NSDCMRMEDTOKEN_GEN_ALL_CORE_FT
aspirin:   ferrous sulfate 325 mg (65 mg elemental iron) oral tablet: 1 tab(s) orally 2 times a day  folic acid 1 mg oral tablet: 1 tab(s) orally once a day  furosemide 40 mg oral tablet: 1 tab(s) orally once a day  HYDROmorphone 2 mg oral tablet: 1 tab(s) orally every 4 hours, As needed, Moderate Pain (4 - 6) MDD:4 tablets  insulin lispro 100 units/mL injectable solution: 13 unit(s) injectable 3 times a day (before meals) x 30 days   labetalol 200 mg oral tablet: 1 tab(s) orally every 8 hours  Levemir FlexTouch 100 units/mL subcutaneous solution: 25 unit(s) subcutaneous once a day   Pepcid:   potassium chloride 20 mEq oral tablet, extended release: 1 tab(s) orally once a day  potassium chloride 20 mEq oral tablet, extended release: 1 tab(s) orally once a day  PriLOSEC:

## 2021-02-28 NOTE — PROGRESS NOTE ADULT - PROBLEM SELECTOR PLAN 4
Overweight/Obesity: Patient counseled for weight loss, exercise, low calorie diet.
Overweight/Obesity: Patient counseled for weight loss, exercise, low calorie diet.
No heavy lifting
Overweight/Obesity: Patient counseled for weight loss, exercise, low calorie diet.
Overweight/Obesity: Patient counseled for weight loss, exercise, low calorie diet.

## 2021-02-28 NOTE — PROGRESS NOTE ADULT - PROBLEM SELECTOR PROBLEM 4
Morbid obesity with BMI of 40.0-44.9, adult

## 2021-02-28 NOTE — DISCHARGE NOTE PROVIDER - NSDCCPCAREPLAN_GEN_ALL_CORE_FT
PRINCIPAL DISCHARGE DIAGNOSIS  Diagnosis: S/P aortic dissection repair  Assessment and Plan of Treatment:

## 2021-02-28 NOTE — PROGRESS NOTE ADULT - PROBLEM SELECTOR PROBLEM 2
Acute hyperglycemia
Thyroid nodule
Acute hyperglycemia
Thyroid nodule
Acute hyperglycemia
Acute hyperglycemia
Thyroid nodule
Thyroid nodule

## 2021-02-28 NOTE — DISCHARGE NOTE PROVIDER - CARE PROVIDER_API CALL
Use ketoconazole 2% shampoo on your scalp and chest.  Use sulfacetamide-sulfur cleanser on your face once or twice daily.     Monster Louis)  Surgery; Thoracic Surgery  [address] [place] [address]  Phone: [phone]  Fax: [phone]  Follow Up Time:

## 2021-02-28 NOTE — PROGRESS NOTE ADULT - PROBLEM SELECTOR PROBLEM 3
S/P ascending aortic aneurysm repair

## 2021-02-28 NOTE — PROGRESS NOTE ADULT - PROBLEM SELECTOR PLAN 3
On medications,  no chest pain, stable, monitored and followed up by primary cardiothoracic team/cardiology team.

## 2021-02-28 NOTE — DISCHARGE NOTE PROVIDER - NSDCFUADDAPPT_GEN_ALL_CORE_FT
Dr. Louis-please call office on Monday March 1, 2021 and make appointment for 1-2 weeks. Number is .

## 2021-02-28 NOTE — DISCHARGE NOTE NURSING/CASE MANAGEMENT/SOCIAL WORK - PATIENT PORTAL LINK FT
You can access the FollowMyHealth Patient Portal offered by [facility] by registering at the following website: [email or web address]. By joining "[employer]’s FollowMyHealth portal, you will also be able to view your health information using other applications (apps) compatible with our system.

## 2021-02-28 NOTE — PROGRESS NOTE ADULT - PROBLEM SELECTOR PROBLEM 1
S/P ascending aortic aneurysm repair
DM (diabetes mellitus)
S/P ascending aortic aneurysm repair
DM (diabetes mellitus)
DM (diabetes mellitus)
S/P ascending aortic aneurysm repair
DM (diabetes mellitus)

## 2021-02-28 NOTE — PROGRESS NOTE ADULT - PROVIDER SPECIALTY LIST ADULT
Anesthesia
Anesthesia
Critical Care
Anesthesia
Critical Care
Anesthesia
Endocrinology
CT Surgery
CT Surgery
Endocrinology
CT Surgery
Endocrinology
Endocrinology
CT Surgery

## 2021-02-28 NOTE — PROGRESS NOTE ADULT - SUBJECTIVE AND OBJECTIVE BOX
CTS NP    Patient is a 45y old  Female who presents with a chief complaint of Type A dissection (27 Feb 2021 20:41)      HPI:  44 y/o woman, h/o lumbar meningocele, left iliac stent, DM (on Metformin and glimepiride), lymphedema, c/o chest pain radiating to jaw and abdomen and back, dizziness, nausea and vomiting today.  No SOB/fever/cough/syncope.  Pt had COVID-19 in March 2020 and says she tested positive for COVID-19 Ab after that.  pt underwent a ct scan angio which showed a Type A dissection . pt transferred for emergent surgery.  (22 Feb 2021 03:54)      PAST MEDICAL & SURGICAL HISTORY:  Meningocele    Lymphedema    DM (diabetes mellitus)    No significant past surgical history        MEDICATIONS  (STANDING):  aspirin  chewable 81 milliGRAM(s) Oral daily  chlorhexidine 2% Cloths 1 Application(s) Topical <User Schedule>  dextrose 40% Gel 15 Gram(s) Oral once  dextrose 5%. 1000 milliLiter(s) (15 mL/Hr) IV Continuous <Continuous>  dextrose 50% Injectable 50 milliLiter(s) IV Push every 15 minutes  dextrose 50% Injectable 25 milliLiter(s) IV Push every 15 minutes  enoxaparin Injectable 30 milliGRAM(s) SubCutaneous every 12 hours  famotidine    Tablet 20 milliGRAM(s) Oral daily  ferrous    sulfate 325 milliGRAM(s) Oral two times a day  folic acid 1 milliGRAM(s) Oral daily  furosemide    Tablet 40 milliGRAM(s) Oral daily  glucagon  Injectable 1 milliGRAM(s) IntraMuscular once  influenza   Vaccine 0.5 milliLiter(s) IntraMuscular once  insulin glargine Injectable (LANTUS) 25 Unit(s) SubCutaneous at bedtime  insulin lispro (ADMELOG) corrective regimen sliding scale   SubCutaneous three times a day before meals  insulin lispro (ADMELOG) corrective regimen sliding scale   SubCutaneous at bedtime  insulin lispro Injectable (ADMELOG) 13 Unit(s) SubCutaneous three times a day before meals  labetalol 200 milliGRAM(s) Oral every 8 hours  metoclopramide 5 milliGRAM(s) Oral three times a day  pantoprazole    Tablet 40 milliGRAM(s) Oral before breakfast  polyethylene glycol 3350 17 Gram(s) Oral daily  potassium chloride    Tablet ER 20 milliEquivalent(s) Oral daily  sodium chloride 0.9%. 1000 milliLiter(s) (10 mL/Hr) IV Continuous <Continuous>  sorbitol 70% Solution 30 milliLiter(s) Oral daily    MEDICATIONS  (PRN):  HYDROmorphone   Tablet 2 milliGRAM(s) Oral every 4 hours PRN Moderate Pain (4 - 6)  HYDROmorphone   Tablet 4 milliGRAM(s) Oral every 4 hours PRN Severe Pain (7 - 10)  ondansetron Injectable 4 milliGRAM(s) IV Push every 6 hours PRN Nausea      Allergies    ibuprofen (Eye Irritation; Swelling)    Intolerance    REVIEW OF SYSTEMS    General: WDWN	    Skin/Breast: no rashes; incisions to mid sternum and right upper chest  	  Ophthalmologic: no visual disturbances  	  ENMT:	    Respiratory and Thorax: no SOB  	  Cardiovascular:	No chest pain, no dizziness    Gastrointestinal:	no diarrhea, no constipation, no nausea/vomiting    Genitourinary: no dysuria	    Musculoskeletal:	 no weakness    Neurological: no focal deficits	    Psychiatric: no depression/anxiety	    Hematology/Lymphatics:	 +lymphedema left side    Endocrine: +DM	    Allergic/Immunologic:	  Vital Signs Last 24 Hrs  T(C): 37 (28 Feb 2021 04:51), Max: 37 (28 Feb 2021 04:51)  T(F): 98.6 (28 Feb 2021 04:51), Max: 98.6 (28 Feb 2021 04:51)  HR: 79 (28 Feb 2021 04:51) (79 - 93)  BP: 123/74 (28 Feb 2021 04:51) (123/74 - 136/80)  BP(mean): 90 (28 Feb 2021 04:51) (90 - 98)  RR: 18 (28 Feb 2021 04:51) (18 - 18)  SpO2: 91% (28 Feb 2021 04:51) (91% - 94%)    PHYSICAL EXAM: W  Neuro: A&Ox3  Lungs: CTA bilaterally  COR: S1S2 present; no murmurs/rubs/gallops noted  Extremities: GAYTAN, no peripheral edema, + lymphadenopathy to LLE  Pain: No pain    LABS:                        8.0    10.21 )-----------( 237      ( 28 Feb 2021 05:18 )             26.4     02-28    138  |  101  |  14  ----------------------------<  119<H>  4.3   |  27  |  0.55    Ca    8.8      28 Feb 2021 05:17          CAPILLARY BLOOD GLUCOSE      POCT Blood Glucose.: 154 mg/dL (28 Feb 2021 07:48)  POCT Blood Glucose.: 167 mg/dL (27 Feb 2021 21:58)  POCT Blood Glucose.: 204 mg/dL (27 Feb 2021 16:33)  POCT Blood Glucose.: 117 mg/dL (27 Feb 2021 12:24)      RADIOLOGY & ADDITIONAL TESTS:

## 2021-02-28 NOTE — PROGRESS NOTE ADULT - ASSESSMENT
46 y/o woman, h/o lumbar meningocele, left iliac stent, DM (on Metformin and glimepiride), lymphedema, c/o chest pain radiating to jaw and abdomen and back, dizziness, nausea and vomiting today.  No SOB/fever/cough/syncope.  Pt had COVID-19 in March 2020 and says she tested positive for COVID-19 Ab after that.  pt underwent a ct scan angio which showed a Type A dissection . pt transferred for emergent surgery.   2/22/21  s/p Repair of Type A Aortic dissection with 28mm Hemashield [place] ascending aorta interposition graft. Resuspension of the aortic valve.  Aortic cross clamp time of 101 minutes.  Right axillary cutdown with 8mm graft to the axillary artery for arterial cannulation  Hypotension, elevated lactate, on norepinephrine, iv fluid resuscitation, multiple blood products  Correction in bp and decrease in lactate. PCA for pain control.  Ins gtt  2/23 transferred to sdu  2/24 HD stable.  Med 85cc/24.  PCA- dc'd  2/25 HD stable.  Mediastinal tube removed.  Transfer to floor.   2/26 VVS; 's --> Labetalol increased to 200 mg PO TID for better BP control.  Started on Lasix 40 mg PO daily and supplemental KCL 20 MEQ PO daily.   2/27 VSS weight up 1.5  kg Lasix  20mg IV  x 1  c/w   diuretic regimen Sorbitol for  BM  Pacing wires removed dressings remob=babak shower today discharge to home in am likely with insulin  Disposition: Home PT in am   2/28 VSS weight decreased by 1.5 kg; discharge home today

## 2021-02-28 NOTE — DISCHARGE NOTE PROVIDER - HOSPITAL COURSE
46 y/o woman, h/o lumbar meningocele, left iliac stent, DM (on Metformin and glimepiride), lymphedema, c/o chest pain radiating to jaw and abdomen and back, dizziness, nausea and vomiting today.  No SOB/fever/cough/syncope.  Pt had COVID-19 in March 2020 and says she tested positive for COVID-19 Ab after that.  pt underwent a ct scan angio which showed a Type A dissection . pt transferred for emergent surgery.   2/22/21  s/p Repair of Type A Aortic dissection with 28mm Hemashield [place] ascending aorta interposition graft. Resuspension of the aortic valve.  Aortic cross clamp time of 101 minutes.  Right axillary cutdown with 8mm graft to the axillary artery for arterial cannulation  Hypotension, elevated lactate, on norepinephrine, iv fluid resuscitation, multiple blood products  Correction in bp and decrease in lactate. PCA for pain control.  Ins gtt  2/23 transferred to sdu  2/24 HD stable.  Med 85cc/24.  PCA- dc'd  2/25 HD stable.  Mediastinal tube removed.  Transfer to floor.   2/26 VVS; 's --> Labetalol increased to 200 mg PO TID for better BP control.  Started on Lasix 40 mg PO daily and supplemental KCL 20 MEQ PO daily.   2/27 VSS weight up 1.5  kg Lasix  20mg IV  x 1  c/w   diuretic regimen Sorbitol for  BM  Pacing wires removed dressings remob=babak shower today discharge to home in am likely with insulin  Disposition: Home PT in am   2/28 VSS weight decreased by 1.5 kg; discharge home today

## 2021-03-01 ENCOUNTER — TRANSCRIPTION ENCOUNTER (OUTPATIENT)
Age: 46
End: 2021-03-01

## 2021-03-01 RX ORDER — BLOOD SUGAR DIAGNOSTIC
STRIP MISCELLANEOUS 4 TIMES DAILY
Qty: 120 | Refills: 1 | Status: ACTIVE | COMMUNITY
Start: 2021-03-01 | End: 1900-01-01

## 2021-03-01 RX ORDER — LANCETS 33 GAUGE
EACH MISCELLANEOUS
Qty: 4 | Refills: 3 | Status: ACTIVE | COMMUNITY
Start: 2021-03-01 | End: 1900-01-01

## 2021-03-01 RX ORDER — ISOPROPYL ALCOHOL 0.7 ML/ML
SWAB TOPICAL
Qty: 1 | Refills: 3 | Status: ACTIVE | COMMUNITY
Start: 2021-03-01 | End: 1900-01-01

## 2021-03-01 RX ORDER — BLOOD-GLUCOSE METER
W/DEVICE KIT MISCELLANEOUS
Qty: 1 | Refills: 0 | Status: ACTIVE | COMMUNITY
Start: 2021-03-01 | End: 1900-01-01

## 2021-03-02 ENCOUNTER — APPOINTMENT (OUTPATIENT)
Dept: CARE COORDINATION | Facility: HOME HEALTH | Age: 46
End: 2021-03-02
Payer: MEDICARE

## 2021-03-02 VITALS — HEART RATE: 74 BPM | DIASTOLIC BLOOD PRESSURE: 70 MMHG | SYSTOLIC BLOOD PRESSURE: 122 MMHG | OXYGEN SATURATION: 95 %

## 2021-03-02 DIAGNOSIS — Z87.898 PERSONAL HISTORY OF OTHER SPECIFIED CONDITIONS: ICD-10-CM

## 2021-03-02 DIAGNOSIS — I89.0 LYMPHEDEMA, NOT ELSEWHERE CLASSIFIED: ICD-10-CM

## 2021-03-02 DIAGNOSIS — Z86.39 PERSONAL HISTORY OF OTHER ENDOCRINE, NUTRITIONAL AND METABOLIC DISEASE: ICD-10-CM

## 2021-03-02 PROCEDURE — 99024 POSTOP FOLLOW-UP VISIT: CPT

## 2021-03-02 RX ORDER — FERROUS SULFATE 325(65) MG
TABLET ORAL
Refills: 0 | Status: ACTIVE | COMMUNITY

## 2021-03-02 RX ORDER — FAMOTIDINE 20 MG/1
20 TABLET, FILM COATED ORAL
Refills: 0 | Status: ACTIVE | COMMUNITY

## 2021-03-02 RX ORDER — ASPIRIN 81 MG
81 TABLET, DELAYED RELEASE (ENTERIC COATED) ORAL
Refills: 0 | Status: ACTIVE | COMMUNITY

## 2021-03-02 NOTE — REVIEW OF SYSTEMS
[Feeling Tired] : feeling tired [SOB on Exertion] : shortness of breath during exertion [As Noted in HPI] : as noted in HPI [Dizziness] : dizziness [Negative] : Heme/Lymph

## 2021-03-02 NOTE — ASSESSMENT
[FreeTextEntry1] : 44 y/o woman, h/o lumbar meningocele, left iliac stent, DM (on Metformin and \par glimepiride), lymphedema, COVID-19 march 2020\par ct scan angio which showed a Type A dissection . \par Went for emergent surgery. \par 2/22/21  s/p Repair of Type A Aortic dissection with 28mm Hemashield [place] \par ascending aorta interposition graft.

## 2021-03-02 NOTE — PHYSICAL EXAM
[Sclera] : the sclera and conjunctiva were normal [Neck Appearance] : the appearance of the neck was normal [] : no respiratory distress [Decreased Breath Sounds] : decreased breath sounds [Apical Impulse] : the apical impulse was normal [Heart Rate And Rhythm] : heart rate was normal and rhythm regular [Heart Sounds] : normal S1 and S2 [1+] : left 1+ [Breast Appearance] : normal in appearance [Bowel Sounds] : normal bowel sounds [Abdomen Tenderness] : non-tender [No CVA Tenderness] : no ~M costovertebral angle tenderness [Abnormal Walk] : normal gait [Skin Color & Pigmentation] : normal skin color and pigmentation [FreeTextEntry1] : MTI-approximated- RT ACW approximated [Oriented To Time, Place, And Person] : oriented to person, place, and time [Affect] : the affect was normal

## 2021-03-02 NOTE — HISTORY OF PRESENT ILLNESS
[Diabetes Mellitus] : Diabetes Mellitus [Diet] : controlled with diet [Oral] : controlled with oral diabetes medication [FreeTextEntry1] : FOLLOW YOUR HEART HOME [email or web address]\par Home Visit made Claudio PATTON ] . A  patient of Dr Mnadeep Louis   S/P Type A dissection repair   on 2/22. Discharged from [facility]\par \par Visiting patient for post discharge transitional care management and post surgical follow up. \par arrived to  apartment. She is accompanied by her SON and sister.\par She appears "malancholy"\par she states she is always scared to walk around, "doesn’t understand how "all of this happened to her".\par she has been having dizzy spells after standing or after getting off of toilet .  She has been sitting on the toilet for long periods.  She is able to have bowel movements .\par \par had difficulties getting insulins ( lispro and levemir) as prescribed at discharge.  [facility] pharmacy needed to order them.  she went back on metformin and Amaryl.  states she prefers metformin and Amaryl... Blood sugars have lauren on 100-150s pre meals on the 2.\par She is asking to maintain that regimen for now .\par \par Not able to sleep at night. \par \par

## 2021-03-05 ENCOUNTER — TRANSCRIPTION ENCOUNTER (OUTPATIENT)
Age: 46
End: 2021-03-05

## 2021-03-06 ENCOUNTER — TRANSCRIPTION ENCOUNTER (OUTPATIENT)
Age: 46
End: 2021-03-06

## 2021-03-06 ENCOUNTER — NON-APPOINTMENT (OUTPATIENT)
Age: 46
End: 2021-03-06

## 2021-03-07 ENCOUNTER — NON-APPOINTMENT (OUTPATIENT)
Age: 46
End: 2021-03-07

## 2021-03-08 ENCOUNTER — TRANSCRIPTION ENCOUNTER (OUTPATIENT)
Age: 46
End: 2021-03-08

## 2021-03-08 ENCOUNTER — NON-APPOINTMENT (OUTPATIENT)
Age: 46
End: 2021-03-08

## 2021-03-09 ENCOUNTER — TRANSCRIPTION ENCOUNTER (OUTPATIENT)
Age: 46
End: 2021-03-09

## 2021-03-10 ENCOUNTER — APPOINTMENT (OUTPATIENT)
Dept: CARDIOTHORACIC SURGERY | Facility: CLINIC | Age: 46
End: 2021-03-10
Payer: MEDICARE

## 2021-03-10 VITALS
HEART RATE: 99 BPM | BODY MASS INDEX: 39.34 KG/M2 | WEIGHT: 281 LBS | HEIGHT: 71 IN | OXYGEN SATURATION: 97 % | SYSTOLIC BLOOD PRESSURE: 144 MMHG | DIASTOLIC BLOOD PRESSURE: 84 MMHG | RESPIRATION RATE: 14 BRPM

## 2021-03-10 VITALS — HEART RATE: 102 BPM | DIASTOLIC BLOOD PRESSURE: 83 MMHG | SYSTOLIC BLOOD PRESSURE: 136 MMHG

## 2021-03-10 PROCEDURE — 99024 POSTOP FOLLOW-UP VISIT: CPT

## 2021-03-10 RX ORDER — FUROSEMIDE 40 MG/1
40 TABLET ORAL
Refills: 0 | Status: COMPLETED | COMMUNITY
End: 2021-03-10

## 2021-03-10 RX ORDER — POTASSIUM CHLORIDE 1500 MG/1
20 TABLET, FILM COATED, EXTENDED RELEASE ORAL
Refills: 0 | Status: COMPLETED | COMMUNITY
End: 2021-03-10

## 2021-03-10 RX ORDER — METFORMIN HYDROCHLORIDE 500 MG/1
500 TABLET, COATED ORAL
Qty: 60 | Refills: 0 | Status: ACTIVE | COMMUNITY
Start: 2021-03-10

## 2021-03-10 RX ORDER — INSULIN LISPRO 100 [IU]/ML
INJECTION, SOLUTION INTRAVENOUS; SUBCUTANEOUS
Refills: 0 | Status: COMPLETED | COMMUNITY
End: 2021-03-10

## 2021-03-10 RX ORDER — INSULIN ASPART 100 [IU]/ML
100 INJECTION, SOLUTION INTRAVENOUS; SUBCUTANEOUS 3 TIMES DAILY
Qty: 1 | Refills: 1 | Status: COMPLETED | COMMUNITY
Start: 2021-03-01 | End: 2021-03-10

## 2021-03-10 RX ORDER — GLIMEPIRIDE 2 MG/1
2 TABLET ORAL DAILY
Refills: 0 | Status: ACTIVE | COMMUNITY
Start: 2021-03-10

## 2021-03-12 ENCOUNTER — TRANSCRIPTION ENCOUNTER (OUTPATIENT)
Age: 46
End: 2021-03-12

## 2021-03-12 ENCOUNTER — NON-APPOINTMENT (OUTPATIENT)
Age: 46
End: 2021-03-12

## 2021-03-15 ENCOUNTER — NON-APPOINTMENT (OUTPATIENT)
Age: 46
End: 2021-03-15

## 2021-03-15 ENCOUNTER — TRANSCRIPTION ENCOUNTER (OUTPATIENT)
Age: 46
End: 2021-03-15

## 2021-03-16 PROCEDURE — 82330 ASSAY OF CALCIUM: CPT

## 2021-03-16 PROCEDURE — 82962 GLUCOSE BLOOD TEST: CPT

## 2021-03-16 PROCEDURE — 85730 THROMBOPLASTIN TIME PARTIAL: CPT

## 2021-03-16 PROCEDURE — 85384 FIBRINOGEN ACTIVITY: CPT

## 2021-03-16 PROCEDURE — P9045: CPT

## 2021-03-16 PROCEDURE — 85018 HEMOGLOBIN: CPT

## 2021-03-16 PROCEDURE — 83605 ASSAY OF LACTIC ACID: CPT

## 2021-03-16 PROCEDURE — 84439 ASSAY OF FREE THYROXINE: CPT

## 2021-03-16 PROCEDURE — 85396 CLOTTING ASSAY WHOLE BLOOD: CPT

## 2021-03-16 PROCEDURE — 97116 GAIT TRAINING THERAPY: CPT

## 2021-03-16 PROCEDURE — 86923 COMPATIBILITY TEST ELECTRIC: CPT

## 2021-03-16 PROCEDURE — 97530 THERAPEUTIC ACTIVITIES: CPT

## 2021-03-16 PROCEDURE — 93005 ELECTROCARDIOGRAM TRACING: CPT

## 2021-03-16 PROCEDURE — P9047: CPT

## 2021-03-16 PROCEDURE — 83036 HEMOGLOBIN GLYCOSYLATED A1C: CPT

## 2021-03-16 PROCEDURE — 87635 SARS-COV-2 COVID-19 AMP PRB: CPT

## 2021-03-16 PROCEDURE — 82435 ASSAY OF BLOOD CHLORIDE: CPT

## 2021-03-16 PROCEDURE — 97162 PT EVAL MOD COMPLEX 30 MIN: CPT

## 2021-03-16 PROCEDURE — 88305 TISSUE EXAM BY PATHOLOGIST: CPT

## 2021-03-16 PROCEDURE — 82550 ASSAY OF CK (CPK): CPT

## 2021-03-16 PROCEDURE — 84484 ASSAY OF TROPONIN QUANT: CPT

## 2021-03-16 PROCEDURE — P9037: CPT

## 2021-03-16 PROCEDURE — 71045 X-RAY EXAM CHEST 1 VIEW: CPT

## 2021-03-16 PROCEDURE — P9016: CPT

## 2021-03-16 PROCEDURE — 86850 RBC ANTIBODY SCREEN: CPT

## 2021-03-16 PROCEDURE — 86891 AUTOLOGOUS BLOOD OP SALVAGE: CPT

## 2021-03-16 PROCEDURE — 85014 HEMATOCRIT: CPT

## 2021-03-16 PROCEDURE — U0005: CPT

## 2021-03-16 PROCEDURE — 84132 ASSAY OF SERUM POTASSIUM: CPT

## 2021-03-16 PROCEDURE — 82803 BLOOD GASES ANY COMBINATION: CPT

## 2021-03-16 PROCEDURE — 84100 ASSAY OF PHOSPHORUS: CPT

## 2021-03-16 PROCEDURE — U0003: CPT

## 2021-03-16 PROCEDURE — 82565 ASSAY OF CREATININE: CPT

## 2021-03-16 PROCEDURE — 86901 BLOOD TYPING SEROLOGIC RH(D): CPT

## 2021-03-16 PROCEDURE — 84443 ASSAY THYROID STIM HORMONE: CPT

## 2021-03-16 PROCEDURE — C1889: CPT

## 2021-03-16 PROCEDURE — 82553 CREATINE MB FRACTION: CPT

## 2021-03-16 PROCEDURE — 83735 ASSAY OF MAGNESIUM: CPT

## 2021-03-16 PROCEDURE — 82947 ASSAY GLUCOSE BLOOD QUANT: CPT

## 2021-03-16 PROCEDURE — 86965 POOLING BLOOD PLATELETS: CPT

## 2021-03-16 PROCEDURE — C1751: CPT

## 2021-03-16 PROCEDURE — P9059: CPT

## 2021-03-16 PROCEDURE — C1769: CPT

## 2021-03-16 PROCEDURE — P9012: CPT

## 2021-03-16 PROCEDURE — 84295 ASSAY OF SERUM SODIUM: CPT

## 2021-03-16 PROCEDURE — 80048 BASIC METABOLIC PNL TOTAL CA: CPT

## 2021-03-16 PROCEDURE — 85025 COMPLETE CBC W/AUTO DIFF WBC: CPT

## 2021-03-16 PROCEDURE — C1768: CPT

## 2021-03-16 PROCEDURE — 86900 BLOOD TYPING SEROLOGIC ABO: CPT

## 2021-03-16 PROCEDURE — 80053 COMPREHEN METABOLIC PANEL: CPT

## 2021-03-16 PROCEDURE — 94002 VENT MGMT INPAT INIT DAY: CPT

## 2021-03-16 PROCEDURE — 85027 COMPLETE CBC AUTOMATED: CPT

## 2021-03-16 PROCEDURE — 85610 PROTHROMBIN TIME: CPT

## 2021-03-24 ENCOUNTER — APPOINTMENT (OUTPATIENT)
Dept: CARDIOTHORACIC SURGERY | Facility: CLINIC | Age: 46
End: 2021-03-24
Payer: MEDICARE

## 2021-03-24 VITALS
DIASTOLIC BLOOD PRESSURE: 83 MMHG | HEART RATE: 88 BPM | RESPIRATION RATE: 14 BRPM | SYSTOLIC BLOOD PRESSURE: 140 MMHG | BODY MASS INDEX: 39.9 KG/M2 | TEMPERATURE: 97.7 F | WEIGHT: 285 LBS | OXYGEN SATURATION: 99 % | HEIGHT: 71 IN

## 2021-03-24 PROCEDURE — 99024 POSTOP FOLLOW-UP VISIT: CPT

## 2021-03-29 ENCOUNTER — APPOINTMENT (OUTPATIENT)
Dept: CARDIOLOGY | Facility: CLINIC | Age: 46
End: 2021-03-29
Payer: MEDICARE

## 2021-03-29 VITALS
DIASTOLIC BLOOD PRESSURE: 78 MMHG | HEART RATE: 85 BPM | WEIGHT: 285 LBS | OXYGEN SATURATION: 98 % | TEMPERATURE: 98.1 F | SYSTOLIC BLOOD PRESSURE: 136 MMHG | RESPIRATION RATE: 17 BRPM | HEIGHT: 71 IN | BODY MASS INDEX: 39.9 KG/M2

## 2021-03-29 PROCEDURE — 93000 ELECTROCARDIOGRAM COMPLETE: CPT

## 2021-03-29 PROCEDURE — 99205 OFFICE O/P NEW HI 60 MIN: CPT

## 2021-03-30 ENCOUNTER — TRANSCRIPTION ENCOUNTER (OUTPATIENT)
Age: 46
End: 2021-03-30

## 2021-04-28 ENCOUNTER — APPOINTMENT (OUTPATIENT)
Dept: CARDIOLOGY | Facility: CLINIC | Age: 46
End: 2021-04-28
Payer: MEDICARE

## 2021-04-28 PROCEDURE — 99205 OFFICE O/P NEW HI 60 MIN: CPT | Mod: 95

## 2021-04-28 NOTE — PHYSICAL EXAM
[TWNoteComboBox3] : 0 [TWNoteComboBox4] : 0 [TWNoteComboBox6] : 1 [TWNoteComboBox7] : 0 [de-identified] : 0 [de-identified] : 1 [de-identified] : 1 [de-identified] : 0

## 2021-04-28 NOTE — HISTORY OF PRESENT ILLNESS
[Home] : at home, [unfilled] , at the time of the visit. 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,\par DM,  hx covid 1 yr ago with recurrent cough\par She had sharp chest pain radiating to her back, severe back pain, jaw pain, sudden onset after bending over to pick something off the floor.\par felt  "a burning fireball that ripped off her chest jumped into her stomach" then left numbness" unable to move to control pain\par She went to the hospital \par she did not know she had an aortic aneurysm prior to to her dissection \par \par no arthritis hx \par ,no abnormal scarring\par denies hypermobility \par no prior easily bruising\par no cleft lip or palate\par no dental crowding \par tall 5ft 11in\par + stria\par + hx umbilical and inguinal hernias\par hiatial hernia hx congenital\par incisional site hernias\par + myopia since childhood 2nd grade\par + flat feet\par no hx retinal detachment\par + velvet skin\par \par today she is referred for a cardiogenomic evaluation\par \par

## 2021-04-28 NOTE — FAMILY HISTORY
[FreeTextEntry1] : FamilyHistory_20_twCiteListControlStart FamilyHistory_20_twCiteListControlEnd [id number] \par A four-generation family history was constructed and scanned into [employer]. \par Family history is significant for: \par siblings\par 62 yo sister no med probs- 42 yo son ( 14 yo  son, 5 yo son, 3 yo son), 39 yo son ( 3 yo daughter)  no known med probs\par 59 yo sister- one late misscharage child loss at 8-9M due to possible hydrops\par 39 yo daughter ( 10 yo daughter, 9 yo daughter, 3 yo son), 34 yo son ( 8M daughter)\par \par \par Mother dec 84 yo DM, HTN \par Maternal aunts x3\par dec aunt  br ca 69 yo daughter 60s healthy\par dec aunt 78 yo sepsis son , daughter healthy \par aunt 72 yo thyroid CA, DM, HTN- 3 daughter and 1 son all healthy\par \par Maternal uncles x2\par uncle dec colon CA at 69 yo\par uncle 69 yo hx coagulation problems- requires FFP prior to surgeries- 2 sons and I daughter\par Maternal Grandmother  dec 92 yo hip, fall, HTN hx coagulation problems- requires FFP\par Maternal Grandfather dec 80 yo PNA complications\par \par Father 78 yo MDS\par Paternal aunts x1 dec unk age and cause- daughter, daughter\par Paternal uncles x1 81 yo COPD - 56 yo son, 51yo daughter healthy \par Paternal Grandfather dec unk\par Paternal Grandmother dec lost , deafness from Abx- \par \par children:\par 21yo son, + nearsighted\par 18 yo son DM after COVID, now improving, + nearsighted\par \par her maternal families [place] from [place] and paternal families originate from [place] \par No Ashkenazi Adventist ancestry. \par Family history was negative for consanguinity  \par No family history of SIDS\par  \par \par

## 2021-04-28 NOTE — REASON FOR VISIT
[FreeTextEntry3] : TOBI PATTON  is being seen  for an initial consultation at the [facility] at [facility] on 04/28/2021.   Ms. PATTON was referred by Dr. Brice for hereditary cardiac predisposition risk assessment and counseling, due to history of type A dissection s/p repair

## 2021-05-26 ENCOUNTER — NON-APPOINTMENT (OUTPATIENT)
Age: 46
End: 2021-05-26

## 2021-06-07 ENCOUNTER — NON-APPOINTMENT (OUTPATIENT)
Age: 46
End: 2021-06-07

## 2021-06-07 ENCOUNTER — APPOINTMENT (OUTPATIENT)
Dept: CARDIOLOGY | Facility: CLINIC | Age: 46
End: 2021-06-07
Payer: MEDICARE

## 2021-06-07 VITALS
SYSTOLIC BLOOD PRESSURE: 131 MMHG | HEART RATE: 88 BPM | OXYGEN SATURATION: 97 % | WEIGHT: 285 LBS | DIASTOLIC BLOOD PRESSURE: 79 MMHG | TEMPERATURE: 96.9 F | BODY MASS INDEX: 39.9 KG/M2 | HEIGHT: 71 IN | RESPIRATION RATE: 16 BRPM

## 2021-06-07 DIAGNOSIS — E11.65 TYPE 2 DIABETES MELLITUS WITH HYPERGLYCEMIA: ICD-10-CM

## 2021-06-07 PROCEDURE — 99214 OFFICE O/P EST MOD 30 MIN: CPT

## 2021-06-07 PROCEDURE — 93000 ELECTROCARDIOGRAM COMPLETE: CPT

## 2021-06-07 RX ORDER — LABETALOL HYDROCHLORIDE 100 MG/1
100 TABLET, FILM COATED ORAL
Qty: 270 | Refills: 3 | Status: ACTIVE | COMMUNITY
Start: 1900-01-01 | End: 1900-01-01

## 2021-06-07 RX ORDER — CLINDAMYCIN HYDROCHLORIDE 300 MG/1
300 CAPSULE ORAL
Qty: 8 | Refills: 1 | Status: ACTIVE | COMMUNITY
Start: 2021-06-07 | End: 1900-01-01

## 2021-06-29 ENCOUNTER — RESULT REVIEW (OUTPATIENT)
Age: 46
End: 2021-06-29

## 2021-07-14 ENCOUNTER — OUTPATIENT (OUTPATIENT)
Dept: OUTPATIENT SERVICES | Facility: HOSPITAL | Age: 46
LOS: 1 days | End: 2021-07-14
Payer: MEDICARE

## 2021-07-14 ENCOUNTER — APPOINTMENT (OUTPATIENT)
Dept: CT IMAGING | Facility: IMAGING CENTER | Age: 46
End: 2021-07-14
Payer: MEDICARE

## 2021-07-14 ENCOUNTER — TRANSCRIPTION ENCOUNTER (OUTPATIENT)
Age: 46
End: 2021-07-14

## 2021-07-14 DIAGNOSIS — Z98.890 OTHER SPECIFIED POSTPROCEDURAL STATES: ICD-10-CM

## 2021-07-14 PROCEDURE — 71275 CT ANGIOGRAPHY CHEST: CPT

## 2021-07-14 PROCEDURE — 82565 ASSAY OF CREATININE: CPT

## 2021-07-14 PROCEDURE — 71275 CT ANGIOGRAPHY CHEST: CPT | Mod: 26,MH

## 2021-07-19 LAB
MISCELLANEOUS TEST: NORMAL
PROC NAME: NORMAL

## 2021-07-20 ENCOUNTER — APPOINTMENT (OUTPATIENT)
Dept: CARDIOTHORACIC SURGERY | Facility: CLINIC | Age: 46
End: 2021-07-20
Payer: MEDICARE

## 2021-07-20 VITALS
SYSTOLIC BLOOD PRESSURE: 142 MMHG | OXYGEN SATURATION: 98 % | DIASTOLIC BLOOD PRESSURE: 80 MMHG | BODY MASS INDEX: 39.9 KG/M2 | HEIGHT: 71 IN | HEART RATE: 83 BPM | WEIGHT: 285 LBS | RESPIRATION RATE: 18 BRPM | TEMPERATURE: 97.9 F

## 2021-07-20 VITALS — SYSTOLIC BLOOD PRESSURE: 135 MMHG | DIASTOLIC BLOOD PRESSURE: 78 MMHG

## 2021-07-20 VITALS — DIASTOLIC BLOOD PRESSURE: 83 MMHG | SYSTOLIC BLOOD PRESSURE: 138 MMHG

## 2021-07-20 DIAGNOSIS — Z78.9 OTHER SPECIFIED HEALTH STATUS: ICD-10-CM

## 2021-07-20 PROCEDURE — 99213 OFFICE O/P EST LOW 20 MIN: CPT

## 2021-07-20 RX ORDER — LORAZEPAM 0.5 MG/1
0.5 TABLET ORAL
Qty: 30 | Refills: 0 | Status: DISCONTINUED | COMMUNITY
Start: 2021-02-08

## 2021-07-20 RX ORDER — FOLIC ACID 1 MG/1
1 TABLET ORAL
Refills: 0 | Status: DISCONTINUED | COMMUNITY
End: 2021-07-20

## 2021-07-20 RX ORDER — CITALOPRAM HYDROBROMIDE 40 MG/1
40 TABLET, FILM COATED ORAL
Qty: 30 | Refills: 0 | Status: DISCONTINUED | COMMUNITY
Start: 2021-06-21

## 2021-07-20 RX ORDER — POTASSIUM CHLORIDE 1500 MG/1
20 TABLET, EXTENDED RELEASE ORAL
Qty: 30 | Refills: 0 | Status: DISCONTINUED | COMMUNITY
Start: 2021-02-28

## 2021-07-20 RX ORDER — FLUCONAZOLE 150 MG/1
150 TABLET ORAL
Qty: 1 | Refills: 0 | Status: DISCONTINUED | COMMUNITY
Start: 2021-02-22

## 2021-07-20 RX ORDER — INSULIN DETEMIR 100 [IU]/ML
100 INJECTION, SOLUTION SUBCUTANEOUS
Qty: 15 | Refills: 0 | Status: DISCONTINUED | COMMUNITY
Start: 2021-02-28

## 2021-07-20 RX ORDER — LABETALOL HYDROCHLORIDE 200 MG/1
200 TABLET, FILM COATED ORAL
Qty: 90 | Refills: 0 | Status: DISCONTINUED | COMMUNITY
Start: 2021-03-25

## 2021-07-20 RX ORDER — OMEPRAZOLE 40 MG/1
40 CAPSULE, DELAYED RELEASE ORAL
Qty: 30 | Refills: 0 | Status: ACTIVE | COMMUNITY
Start: 2021-06-21

## 2021-07-20 RX ORDER — GABAPENTIN 600 MG/1
600 TABLET, COATED ORAL
Qty: 60 | Refills: 0 | Status: ACTIVE | COMMUNITY
Start: 2021-06-21

## 2021-07-20 RX ORDER — HYDROMORPHONE HYDROCHLORIDE 2 MG/1
2 TABLET ORAL
Qty: 20 | Refills: 0 | Status: DISCONTINUED | COMMUNITY
Start: 2021-02-28

## 2021-07-20 NOTE — ASSESSMENT
[FreeTextEntry1] : Flora is a 45 year old female s/p emergent Type A dissection repair on 2/22/2021 with 28mm Hemashield [place]atinum \par ascending aorta interposition graft. Resuspension of the aortic valve. Aortic cross clamp time of 101 minutes. Right axillary cutdown with 8mm graft to the axillary artery for arterial cannulation. DC home on insulin on 2/28/2021.  PMH includes hx lumbar meningocele, left iliac stent, DM (on Metformin and glimepiride) and lymphedema.  She was seen in our office twice in March for post op visits at which times she was gradually progressing. At those times she had been changing her dosing of labetalol as not taking it  Follows Dr. Brice for cardiology.  She presents today sp follow up stable post op chest CTA on 7/14/2021 and reports still being weak but better than what she was before. \par \par She currently denies fever, chills, cough, loss of smell or taste, palpitations, angina, dizziness, lightheadedness, or syncope. She reports having lymphedema in the left leg but is not more than usual. She reports some shortness of breath with walking, she was able to walk to the office from the parking lot with no problem. Her energy level is  "so-so" and her appetite is not good. Denies bowel or bladder problems. She does not exercise. No new hospitalizations, emergency room/urgent care visits, new medical diagnoses, surgery, or cardiac testing since her last office visit. She has/has not had recent labs. She is fully vaccinated against COVID (Moderna , last dose 6/1/2021).\par \par Flora presents today for follow after having surveillance CT last week s/p aneurysm repair. Aorta appears to be stable in size. She complains of some shortness of breath with walking but overall improved from it was as she states she could not walk half a block before the surgery and walked from the parking garage to the office with minimal dyspnea. She is being followed by hematology for anemia and believes some of her shortness of breath could be attributed to that. May swim and exercise as tolerated.  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 Referral to plastic surgery given - Dr. Balaji Burch [phone]. She will follow up with yearly CT scan to evaluate graft and aorta. Follow up with cardiology and PCP respectively. May call the office for questions or concerns or follow up sooner as needed.

## 2021-07-20 NOTE — END OF VISIT
[FreeTextEntry3] : I personally scribed for JING MARVIN on Jul 20 2021  2:00PM . \par \par \par \par \par Physician Attestation:\par Documented by RAMA JOYCE acting as a scribe for JING MARVIN 07/20/2021 . \par \par All medical record entries made by the Scribe were at my, JING MARVIN , direction and personally dictated by me on 07/20/2021 . I have reviewed the chart and agree that the record accurately reflects my personal performance of the history, physical exam, assessment and plan\par \par

## 2021-07-20 NOTE — PHYSICAL EXAM
[Sclera] : the sclera and conjunctiva were normal [PERRL With Normal Accommodation] : pupils were equal in size, round, and reactive to light [Extraocular Movements] : extraocular movements were intact [Neck Appearance] : the appearance of the neck was normal [Neck Cervical Mass (___cm)] : no neck mass was observed [Jugular Venous Distention Increased] : there was no jugular-venous distention [Thyroid Diffuse Enlargement] : the thyroid was not enlarged [Thyroid Nodule] : there were no palpable thyroid nodules [Auscultation Breath Sounds / Voice Sounds] : lungs were clear to auscultation bilaterally [Heart Rate And Rhythm] : heart rate was normal and rhythm regular [Heart Sounds] : normal S1 and S2 [Heart Sounds Gallop] : no gallops [Murmurs] : no murmurs [Heart Sounds Pericardial Friction Rub] : no pericardial rub [Examination Of The Chest] : the chest was normal in appearance [Chest Visual Inspection Thoracic Asymmetry] : no chest asymmetry [Diminished Respiratory Excursion] : normal chest expansion [2+] : left 2+ [No Abnormalities] : the abdominal aorta was not enlarged and no bruit was heard [Bowel Sounds] : normal bowel sounds [Abdomen Tenderness] : non-tender [Abdomen Soft] : soft [] : no hepato-splenomegaly [Abdomen Mass (___ Cm)] : no abdominal mass palpated [Abnormal Walk] : normal gait [Oriented To Time, Place, And Person] : oriented to person, place, and time [Impaired Insight] : insight and judgment were intact [Mood] : the mood was normal [Memory Recent] : recent memory was not impaired [Right Carotid Bruit] : no bruit heard over the right carotid [Left Carotid Bruit] : no bruit heard over the left carotid [Right Femoral Bruit] : no bruit heard over the right femoral artery [Left Femoral Bruit] : no bruit heard over the left femoral artery [Bruit] : no bruit heard [FreeTextEntry1] : incisional keloid noted, greatest at the inferior pole. Tender to palpation. Incision well approximated without opening or drainage.  [No Focal Deficits] : no focal deficits [Affect] : the affect was normal [Memory Remote] : remote memory was not impaired

## 2021-07-20 NOTE — DATA REVIEWED
[FreeTextEntry1] : Chest CTA done 7/14/2021: Status post repair of ascending aorta. No significant change in the aortic dissection involving the aortic arch/descending thoracic aorta when compared to previous exam. Once again, aortic dissection is noted involving the aortic arch extending into the descending thoracic aorta and the proximal portion of the right brachiocephalic artery. The maximum transverse diameter of the aorta at the level of mid aortic arch and mid descending thoracic aorta measures 3 and 3.5 cm respectively. The size and appearance of the aorta is unchanged when compared to previous exam.\par \par

## 2021-07-20 NOTE — HISTORY OF PRESENT ILLNESS
[FreeTextEntry1] : Flora is a 45 year old female s/p emergent Type A dissection repair on 2/22/2021 with 28mm Hemashield [place] \par ascending aorta interposition graft. Resuspension of the aortic valve. Aortic cross clamp time of 101 minutes. Right axillary cutdown with 8 mm graft to the axillary artery for arterial cannulation. DC home on insulin on 2/28/2021.  PMH includes hx lumbar meningocele, left iliac stent, DM (on Metformin and glimepiride) and lymphedema.  She was seen in our office twice in March for post op visits at which times she was gradually progressing. At those times she had been changing her dosing of labetalol as not taking it  Follows Dr. Brice for cardiology.  She presents today sp follow up stable post op chest CTA on 7/14/2021 and reports still being weak but better than what she was before. \par \par She currently denies fever, chills, cough, loss of smell or taste, palpitations, angina, dizziness, lightheadedness, or syncope. She reports having lymphedema in the left leg but is not more than usual. She reports some shortness of breath with walking, she was able to walk to the office from the parking lot with no problem. Her energy level is  "so-so" and her appetite is not good. Denies bowel or bladder problems. She does not exercise. No new hospitalizations, emergency room/urgent care visits, new medical diagnoses, surgery, or cardiac testing since her last office visit. She has/has not had recent labs. She is fully vaccinated against COVID (Moderna , last dose 6/1/2021).\par \par PCP: Dr. Gutierrez\par CARD: Dr. Oleg Brice\par HEME: Dr. Dixon ([facility])\par

## 2021-07-20 NOTE — REVIEW OF SYSTEMS
[Fever] : no fever [Chills] : no chills [Feeling Tired] : feeling tired [Lower Ext Edema] : lower extremity edema [Shortness Of Breath] : shortness of breath [SOB on Exertion] : shortness of breath during exertion [Negative] : Heme/Lymph

## 2021-07-21 ENCOUNTER — APPOINTMENT (OUTPATIENT)
Dept: CARDIOTHORACIC SURGERY | Facility: CLINIC | Age: 46
End: 2021-07-21
Payer: MEDICARE

## 2021-08-11 ENCOUNTER — APPOINTMENT (OUTPATIENT)
Dept: CARDIOLOGY | Facility: CLINIC | Age: 46
End: 2021-08-11
Payer: MEDICARE

## 2021-08-11 PROCEDURE — 99213 OFFICE O/P EST LOW 20 MIN: CPT | Mod: 95

## 2021-08-11 NOTE — HISTORY OF PRESENT ILLNESS
[Home] : at home, [unfilled] , at the time of the visit. 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,\par DM,  hx covid 1 yr ago with recurrent cough\par She had sharp chest pain radiating to her back, severe back pain, jaw pain, sudden onset after bending over to pick something off the floor.\par felt  "a burning fireball that ripped off her chest jumped into her stomach" then left numbness" unable to move to control pain\par She went to the hospital \par she did not know she had an aortic aneurysm prior to to her dissection \par \par no arthritis hx \par ,no abnormal scarring\par denies hypermobility \par no prior easily bruising\par no cleft lip or palate\par no dental crowding \par tall 5ft 11in\par + stria\par + hx umbilical and inguinal hernias\par hiatial hernia hx congenital\par incisional site hernias\par + myopia since childhood 2nd grade\par + flat feet\par no hx retinal detachment\par + velvet skin\par \par she underwent genetic testing and today presents for results \par

## 2021-08-11 NOTE — REASON FOR VISIT
[FreeTextEntry3] : TOBI PATTON  was seen  for an initial consultation at the [facility] at [facility] on 04/28/2021.   Ms. PATTON was referred by Dr. Brice for hereditary cardiac predisposition risk assessment and counseling, due to history of type A dissection s/p repair

## 2021-08-11 NOTE — FAMILY HISTORY
[FreeTextEntry1] : FamilyHistory_20_twCiteListControlStart FamilyHistory_20_twCiteListControlEnd [id number] \par A four-generation family history was constructed and scanned into [employer]. \par Family history is significant for: \par siblings\par 62 yo sister no med probs- 44 yo son ( 12 yo  son, 7 yo son, 3 yo son), 39 yo son ( 3 yo daughter)  no known med probs\par 59 yo sister- one late misscharage child loss at 8-9M due to possible hydrops\par 39 yo daughter ( 10 yo daughter, 7 yo daughter, 3 yo son), 36 yo son ( 8M daughter)\par \par \par Mother dec 84 yo DM, HTN \par Maternal aunts x3\par dec aunt  br ca 71 yo daughter 60s healthy\par dec aunt 76 yo sepsis son , daughter healthy \par aunt 72 yo thyroid CA, DM, HTN- 3 daughter and 1 son all healthy\par \par Maternal uncles x2\par uncle dec colon CA at 71 yo\par uncle 71 yo hx coagulation problems- requires FFP prior to surgeries- 2 sons and I daughter\par Maternal Grandmother  dec 92 yo hip, fall, HTN hx coagulation problems- requires FFP\par Maternal Grandfather dec 78 yo PNA complications\par \par Father 76 yo MDS\par Paternal aunts x1 dec unk age and cause- daughter, daughter\par Paternal uncles x1 79 yo COPD - 56 yo son, 49yo daughter healthy \par Paternal Grandfather dec unk\par Paternal Grandmother dec lost , deafness from Abx- \par \par children:\par 21yo son, + nearsighted\par 18 yo son DM after COVID, now improving, + nearsighted\par \par her maternal families [place] and paternal families originate from [place] \par No Ashkenazi Protestant ancestry. \par Family history was negative for consanguinity  \par No family history of SIDS\par  \par \par

## 2021-08-11 NOTE — PHYSICAL EXAM
[Normal] : normocephalic [TWNoteComboBox3] : 0 [TWNoteComboBox4] : 0 [TWNoteComboBox6] : 1 [TWNoteComboBox7] : 0 [de-identified] : 0 [de-identified] : 1 [de-identified] : 1 [de-identified] : 0

## 2021-09-29 ENCOUNTER — APPOINTMENT (OUTPATIENT)
Dept: OBGYN | Facility: CLINIC | Age: 46
End: 2021-09-29
Payer: MEDICARE

## 2021-09-29 VITALS
BODY MASS INDEX: 39.8 KG/M2 | HEIGHT: 71 IN | DIASTOLIC BLOOD PRESSURE: 82 MMHG | WEIGHT: 284.31 LBS | SYSTOLIC BLOOD PRESSURE: 160 MMHG | HEART RATE: 77 BPM

## 2021-09-29 DIAGNOSIS — N88.9 NONINFLAMMATORY DISORDER OF CERVIX UTERI, UNSPECIFIED: ICD-10-CM

## 2021-09-29 DIAGNOSIS — N95.1 MENOPAUSAL AND FEMALE CLIMACTERIC STATES: ICD-10-CM

## 2021-09-29 DIAGNOSIS — N91.5 OLIGOMENORRHEA, UNSPECIFIED: ICD-10-CM

## 2021-09-29 PROCEDURE — 99205 OFFICE O/P NEW HI 60 MIN: CPT

## 2021-10-01 LAB
FSH SERPL-MCNC: 20.8 IU/L
T4 FREE SERPL-MCNC: 1.3 NG/DL
TSH SERPL-ACNC: 0.44 UIU/ML

## 2021-10-13 ENCOUNTER — NON-APPOINTMENT (OUTPATIENT)
Age: 46
End: 2021-10-13

## 2021-10-15 PROBLEM — N95.1 PERIMENOPAUSE: Status: ACTIVE | Noted: 2021-09-29

## 2022-06-28 ENCOUNTER — OUTPATIENT (OUTPATIENT)
Dept: OUTPATIENT SERVICES | Facility: HOSPITAL | Age: 47
LOS: 1 days | End: 2022-06-28
Payer: MEDICARE

## 2022-06-28 ENCOUNTER — APPOINTMENT (OUTPATIENT)
Dept: CT IMAGING | Facility: IMAGING CENTER | Age: 47
End: 2022-06-28

## 2022-06-28 ENCOUNTER — OUTPATIENT (OUTPATIENT)
Dept: OUTPATIENT SERVICES | Facility: HOSPITAL | Age: 47
LOS: 1 days | End: 2022-06-28

## 2022-06-28 DIAGNOSIS — I71.01 DISSECTION OF THORACIC AORTA: ICD-10-CM

## 2022-06-28 DIAGNOSIS — Z98.890 OTHER SPECIFIED POSTPROCEDURAL STATES: ICD-10-CM

## 2022-06-28 PROCEDURE — 71275 CT ANGIOGRAPHY CHEST: CPT | Mod: 26,MH

## 2022-06-28 PROCEDURE — 74174 CTA ABD&PLVS W/CONTRAST: CPT | Mod: ME

## 2022-06-28 PROCEDURE — 74174 CTA ABD&PLVS W/CONTRAST: CPT | Mod: 26,ME

## 2022-06-28 PROCEDURE — G1004: CPT

## 2022-06-28 PROCEDURE — 71275 CT ANGIOGRAPHY CHEST: CPT

## 2022-08-29 ENCOUNTER — NON-APPOINTMENT (OUTPATIENT)
Age: 47
End: 2022-08-29

## 2022-12-05 ENCOUNTER — APPOINTMENT (OUTPATIENT)
Dept: CARDIOLOGY | Facility: CLINIC | Age: 47
End: 2022-12-05

## 2023-01-09 ENCOUNTER — APPOINTMENT (OUTPATIENT)
Dept: CARDIOLOGY | Facility: CLINIC | Age: 48
End: 2023-01-09
Payer: MEDICARE

## 2023-01-09 VITALS
SYSTOLIC BLOOD PRESSURE: 140 MMHG | WEIGHT: 282 LBS | DIASTOLIC BLOOD PRESSURE: 74 MMHG | OXYGEN SATURATION: 98 % | HEART RATE: 82 BPM | BODY MASS INDEX: 39.33 KG/M2

## 2023-01-09 DIAGNOSIS — Z86.79 OTHER SPECIFIED POSTPROCEDURAL STATES: ICD-10-CM

## 2023-01-09 DIAGNOSIS — M35.9 SYSTEMIC INVOLVEMENT OF CONNECTIVE TISSUE, UNSPECIFIED: ICD-10-CM

## 2023-01-09 DIAGNOSIS — I71.01 DISSECTION OF THORACIC AORTA: ICD-10-CM

## 2023-01-09 DIAGNOSIS — R29.91 UNSPECIFIED SYMPTOMS AND SIGNS INVOLVING THE MUSCULOSKELETAL SYSTEM: ICD-10-CM

## 2023-01-09 DIAGNOSIS — Z98.890 OTHER SPECIFIED POSTPROCEDURAL STATES: ICD-10-CM

## 2023-01-09 DIAGNOSIS — I10 ESSENTIAL (PRIMARY) HYPERTENSION: ICD-10-CM

## 2023-01-09 DIAGNOSIS — Z13.6 ENCOUNTER FOR SCREENING FOR CARDIOVASCULAR DISORDERS: ICD-10-CM

## 2023-01-09 PROCEDURE — 99214 OFFICE O/P EST MOD 30 MIN: CPT

## 2023-01-10 PROBLEM — I71.01 TYPE 1 DISSECTION OF ASCENDING AORTA: Status: ACTIVE | Noted: 2021-03-02

## 2023-01-10 PROBLEM — R29.91 MARFANOID HABITUS: Status: ACTIVE | Noted: 2021-04-28

## 2023-02-10 RX ORDER — HYDROCORTISONE 25 MG/G
2.5 CREAM TOPICAL
Qty: 2 | Refills: 0 | Status: ACTIVE | COMMUNITY
Start: 2023-02-10 | End: 1900-01-01

## 2023-04-03 NOTE — ED ADULT NURSE NOTE - NS ED NURSE RECORD ANOTHER HT AND WT
Pharmacy Vancomycin Initial Note  Date of Service April 3, 2023  Patient's  1996  26 year old, female    Indication: Abscess    Current estimated CrCl = Estimated Creatinine Clearance: 167 mL/min (based on SCr of 0.6 mg/dL).    Creatinine for last 3 days  4/3/2023:  1:36 AM Creatinine 0.60 mg/dL    Recent Vancomycin Level(s) for last 3 days  No results found for requested labs within last 3 days.      Vancomycin IV Administrations (past 72 hours)                   vancomycin (VANCOCIN) 1,500 mg in 0.9% NaCl 250 mL intermittent infusion (mg) 1,500 mg New Bag 23 0409                Nephrotoxins and other renal medications (From now, onward)    Start     Dose/Rate Route Frequency Ordered Stop    23 1600  vancomycin (VANCOCIN) 1,250 mg in 0.9% NaCl 250 mL intermittent infusion         1,250 mg  over 90 Minutes Intravenous EVERY 12 HOURS 23 0537      23 0524  ibuprofen (ADVIL/MOTRIN) tablet 600 mg         600 mg Oral EVERY 6 HOURS PRN 23 0525      23 0330  vancomycin (VANCOCIN) 1,500 mg in 0.9% NaCl 250 mL intermittent infusion         1,500 mg  over 90 Minutes Intravenous ONCE 23 0321            Contrast Orders - past 72 hours (72h ago, onward)    None          [employer] Prediction of Planned Initial Vancomycin Regimen  Loading dose: N/A  Regimen: 1250 mg IV every 12 hours.  Start time: 16:09 on 2023  Exposure target: AUC24 (range)400-600 mg/L.hr   AUC24,ss: 462 mg/L.hr  Probability of AUC24 > 400: 65 %  Ctrough,ss: 13.1 mg/L  Probability of Ctrough,ss > 20: 21 %  Probability of nephrotoxicity (Lodise DARLING ): 8 %          Plan:  1. Start vancomycin  1250 mg IV q12h.  Received 1500 mg loading dose in ER   2. Vancomycin monitoring method: AUC  3. Vancomycin therapeutic monitoring goal: 400-600 mg*h/L  4. Pharmacy will check vancomycin levels as appropriate in 1-3 Days.    5. Serum creatinine levels will be ordered daily for the first week of therapy and at least  twice weekly for subsequent weeks.      Lenard Randle, RPH     Yes 2-3.9 cm

## 2023-06-24 ENCOUNTER — APPOINTMENT (OUTPATIENT)
Dept: CT IMAGING | Facility: IMAGING CENTER | Age: 48
End: 2023-06-24
Payer: MEDICARE

## 2023-06-24 ENCOUNTER — OUTPATIENT (OUTPATIENT)
Dept: OUTPATIENT SERVICES | Facility: HOSPITAL | Age: 48
LOS: 1 days | End: 2023-06-24
Payer: MEDICARE

## 2023-06-24 DIAGNOSIS — I71.010 DISSECTION OF ASCENDING AORTA: ICD-10-CM

## 2023-06-24 DIAGNOSIS — Z98.890 OTHER SPECIFIED POSTPROCEDURAL STATES: ICD-10-CM

## 2023-06-24 PROCEDURE — 74174 CTA ABD&PLVS W/CONTRAST: CPT

## 2023-06-24 PROCEDURE — 74174 CTA ABD&PLVS W/CONTRAST: CPT | Mod: 26,MH

## 2023-06-24 PROCEDURE — 71275 CT ANGIOGRAPHY CHEST: CPT | Mod: 26,MH

## 2023-06-24 PROCEDURE — 71275 CT ANGIOGRAPHY CHEST: CPT

## 2024-07-12 ENCOUNTER — APPOINTMENT (OUTPATIENT)
Dept: CT IMAGING | Facility: IMAGING CENTER | Age: 49
End: 2024-07-12

## 2024-07-12 ENCOUNTER — OUTPATIENT (OUTPATIENT)
Dept: OUTPATIENT SERVICES | Facility: HOSPITAL | Age: 49
LOS: 1 days | End: 2024-07-12
Payer: MEDICARE

## 2024-07-12 DIAGNOSIS — I71.010 DISSECTION OF ASCENDING AORTA: ICD-10-CM

## 2024-07-12 DIAGNOSIS — Z98.890 OTHER SPECIFIED POSTPROCEDURAL STATES: ICD-10-CM

## 2024-07-12 PROCEDURE — 71275 CT ANGIOGRAPHY CHEST: CPT

## 2024-07-12 PROCEDURE — 71275 CT ANGIOGRAPHY CHEST: CPT | Mod: 26,MH

## 2024-07-12 PROCEDURE — 74174 CTA ABD&PLVS W/CONTRAST: CPT | Mod: 26,MH

## 2024-07-12 PROCEDURE — 74174 CTA ABD&PLVS W/CONTRAST: CPT

## 2024-11-30 PROBLEM — I71.010 TYPE 1 DISSECTION OF ASCENDING AORTA: Status: ACTIVE | Noted: 2021-03-02

## 2024-12-02 ENCOUNTER — APPOINTMENT (OUTPATIENT)
Dept: CARDIOLOGY | Facility: CLINIC | Age: 49
End: 2024-12-02
Payer: MEDICARE

## 2024-12-02 VITALS
BODY MASS INDEX: 39.05 KG/M2 | WEIGHT: 280 LBS | TEMPERATURE: 98.2 F | DIASTOLIC BLOOD PRESSURE: 76 MMHG | SYSTOLIC BLOOD PRESSURE: 129 MMHG | RESPIRATION RATE: 17 BRPM | OXYGEN SATURATION: 97 % | HEART RATE: 77 BPM

## 2024-12-02 DIAGNOSIS — I71.010 DISSECTION OF ASCENDING AORTA: ICD-10-CM

## 2024-12-02 DIAGNOSIS — Z98.890 OTHER SPECIFIED POSTPROCEDURAL STATES: ICD-10-CM

## 2024-12-02 DIAGNOSIS — M35.9 SYSTEMIC INVOLVEMENT OF CONNECTIVE TISSUE, UNSPECIFIED: ICD-10-CM

## 2024-12-02 DIAGNOSIS — Z86.79 OTHER SPECIFIED POSTPROCEDURAL STATES: ICD-10-CM

## 2024-12-02 DIAGNOSIS — R29.91 UNSPECIFIED SYMPTOMS AND SIGNS INVOLVING THE MUSCULOSKELETAL SYSTEM: ICD-10-CM

## 2024-12-02 DIAGNOSIS — I10 ESSENTIAL (PRIMARY) HYPERTENSION: ICD-10-CM

## 2024-12-02 PROCEDURE — 99214 OFFICE O/P EST MOD 30 MIN: CPT

## 2024-12-02 PROCEDURE — 93000 ELECTROCARDIOGRAM COMPLETE: CPT

## 2024-12-02 PROCEDURE — G2211 COMPLEX E/M VISIT ADD ON: CPT

## 2025-04-01 ENCOUNTER — APPOINTMENT (OUTPATIENT)
Dept: OBGYN | Facility: CLINIC | Age: 50
End: 2025-04-01

## 2025-06-11 ENCOUNTER — NON-APPOINTMENT (OUTPATIENT)
Age: 50
End: 2025-06-11

## 2025-07-26 ENCOUNTER — APPOINTMENT (OUTPATIENT)
Dept: CT IMAGING | Facility: IMAGING CENTER | Age: 50
End: 2025-07-26
Payer: MEDICARE

## 2025-07-26 ENCOUNTER — OUTPATIENT (OUTPATIENT)
Dept: OUTPATIENT SERVICES | Facility: HOSPITAL | Age: 50
LOS: 1 days | End: 2025-07-26
Payer: MEDICARE

## 2025-07-26 DIAGNOSIS — Z98.890 OTHER SPECIFIED POSTPROCEDURAL STATES: ICD-10-CM

## 2025-07-26 PROCEDURE — 71275 CT ANGIOGRAPHY CHEST: CPT | Mod: 26

## 2025-07-26 PROCEDURE — 74174 CTA ABD&PLVS W/CONTRAST: CPT | Mod: 26

## 2025-07-26 PROCEDURE — 74174 CTA ABD&PLVS W/CONTRAST: CPT

## 2025-07-26 PROCEDURE — 71275 CT ANGIOGRAPHY CHEST: CPT
